# Patient Record
Sex: MALE | Race: WHITE
[De-identification: names, ages, dates, MRNs, and addresses within clinical notes are randomized per-mention and may not be internally consistent; named-entity substitution may affect disease eponyms.]

---

## 2017-03-02 ENCOUNTER — HOSPITAL ENCOUNTER (EMERGENCY)
Dept: HOSPITAL 68 - ERH | Age: 57
Discharge: STILL ADMITTED OR ADMINISTRATIVE | End: 2017-03-02
Payer: COMMERCIAL

## 2017-03-02 VITALS — SYSTOLIC BLOOD PRESSURE: 103 MMHG | DIASTOLIC BLOOD PRESSURE: 71 MMHG

## 2017-03-02 VITALS — HEIGHT: 70 IN | WEIGHT: 180 LBS | BODY MASS INDEX: 25.77 KG/M2

## 2017-03-02 DIAGNOSIS — Z53.21: Primary | ICD-10-CM

## 2017-03-15 ENCOUNTER — HOSPITAL ENCOUNTER (EMERGENCY)
Dept: HOSPITAL 68 - ERH | Age: 57
End: 2017-03-15
Payer: COMMERCIAL

## 2017-03-15 VITALS — HEIGHT: 70 IN | WEIGHT: 180 LBS | BODY MASS INDEX: 25.77 KG/M2

## 2017-03-15 VITALS — SYSTOLIC BLOOD PRESSURE: 126 MMHG | DIASTOLIC BLOOD PRESSURE: 90 MMHG

## 2017-03-15 DIAGNOSIS — F10.10: ICD-10-CM

## 2017-03-15 DIAGNOSIS — F32.9: Primary | ICD-10-CM

## 2017-03-15 LAB
ABSOLUTE GRANULOCYTE CT: 3.9 /CUMM (ref 1.4–6.5)
BASOPHILS # BLD: 0 /CUMM (ref 0–0.2)
BASOPHILS NFR BLD: 0.7 % (ref 0–2)
EOSINOPHIL # BLD: 0.1 /CUMM (ref 0–0.7)
EOSINOPHIL NFR BLD: 1.9 % (ref 0–5)
ERYTHROCYTE [DISTWIDTH] IN BLOOD BY AUTOMATED COUNT: 13.6 % (ref 11.5–14.5)
GRANULOCYTES NFR BLD: 57.7 % (ref 42.2–75.2)
HCT VFR BLD CALC: 52.1 % (ref 42–52)
LYMPHOCYTES # BLD: 2.1 /CUMM (ref 1.2–3.4)
MCH RBC QN AUTO: 34.9 PG (ref 27–31)
MCHC RBC AUTO-ENTMCNC: 34.1 G/DL (ref 33–37)
MCV RBC AUTO: 102.3 FL (ref 80–94)
MONOCYTES # BLD: 0.5 /CUMM (ref 0.1–0.6)
PLATELET # BLD: 158 /CUMM (ref 130–400)
PMV BLD AUTO: 8.6 FL (ref 7.4–10.4)
RED BLOOD CELL CT: 5.09 /CUMM (ref 4.7–6.1)
WBC # BLD AUTO: 6.8 /CUMM (ref 4.8–10.8)

## 2017-03-15 PROCEDURE — G0480 DRUG TEST DEF 1-7 CLASSES: HCPCS

## 2017-03-15 NOTE — ED PSYCHIATRIC COMPLAINT
History of Present Illness
 
General
Chief Complaint: Psychiatric Related Complaint
Stated Complaint: SI/?HI
Source: patient
Exam Limitations: no limitations
Allergies
Coded Allergies:
NO KNOWN ALLERGIES (03/02/17)
 
Reconcile Medications
Amoxicillin 500 MG CAPSULE   2 CAP PO BID ANTIBIOTIC, INFECTION  (Reported)
Aripiprazole (Abilify) 5 MG TABLET   1 TAB PO DAILY MENTAL HEALTH  (Reported)
Clarithromycin 500 MG TABLET   1 TAB PO BID ANTIBIOTIC, INFECTION  (Reported)
Doxepin HCl 50 MG CAPSULE   1-2 CAP PO QPM PRN SLEEP  (Reported)
Escitalopram Oxalate 10 MG TABLET   1 TAB PO DAILY MENTAL HEALTH  (Reported)
Esomeprazole (Nexium) 40 MG CAPSULE.DR   1 CAP PO DAILY GI  (Reported)
Hydroxyzine Pamoate 50 MG CAPSULE   1 CAP PO BID ANXIETY  (Reported)
Tamsulosin HCl 0.4 MG CAP.ER.24H   1 CAP PO DAILY PROSTATE  (Reported)
 
Triage Note:
PT BIBA FROM HOME FOR COMPLANTS OF DEPRESSION. PT
 STATED HE HAS BEEN OF DEPRESSION MEDS
 (LEXAPRO/ABILIFY) FOR APPROX 2-3 WEEKS. PT TEARFUL
 AND "REQUESTING HELP" FOR HIS "DEPRESSION AND
 ANGER". PT DENIED SI/HI. PT ADMITTED TO DRINKING
 FOUR BEERS AND TWO SHOTS OF ALCOHOL. DENIED
 ILLICIT DRUG USE. PT ALSO REQUESTED EVALUATION OF
 SCIATICA. SECURITY AT BEDSIDE FOR WANDING. PT
 CHANGED INTO PAPER SCRUBS.
Triage Nurses Notes Reviewed? yes
HPI:
This patient is a 56-year-old male with a past medical history including alcohol
dependence, pancreatitis, hepatitis C who presented to the emergency department 
today for evaluation of depression.  The patient reported that he has a lot of 
medical problems and has been feeling very depressed and overwhelmed.  He denied
any suicidal ideation.  He did report that he does sometimes think about taking 
his depression out on his wife.  He reported, "not physically."  The patient 
reported that he drinks approximately every other day.  Today he had 4 beers and
2 shots.  The patient denied any illicit drug use.  He denied any chest pain, 
difficulty breathing, or fevers.  He reported that he has withdrawn from alcohol
before, but denied any withdrawal seizures.
(CATALINA JACOBSON,LILY)
 
Vital Signs & Intake/Output
Vital Signs & Intake/Output
 Vital Signs
 
 
Date Time Temp Pulse Resp B/P Pulse O2 O2 Flow FiO2
 
     Ox Delivery Rate 
 
03/15 2132 98.2 98 18 126/90 96 Room Air  
 
03/15 1800 98.1 98 18 124/90 95 Room Air  
 
 
 ED Intake and Output
 
 
 03/16 0000 03/15 1200
 
Intake Total  
 
Output Total  
 
Balance  
 
   
 
Patient 180 lb 
 
Weight  
 
 
 
Past History
 
Travel History
Traveled to [place] past 21 day No
 
Medical History
Any Pertinent Medical History? see below for history
Neurological: NONE
EENT: NONE
Cardiovascular: NONE
Respiratory: NONE
Gastrointestinal: pancreatitis
Hepatic: hepatitis C, ELEVATED LIVER ENZYMES
Renal: NONE
Musculoskeletal: NONE
Psychiatric: anxiety, depression, insomnia, STRESS
Endocrine: NONE
Blood Disorders: NONE
Cancer(s): NONE
GYN/Reproductive: NONE
History of MRSA: No
History of VRE: No
History of CDIFF: No
Isolation History: Standard
 
Surgical History
Surgical History: non-contributory
 
Psychosocial History
Who do you live with Friend
Services at Home None
What is your primary language English
Tobacco Use: Current Daily Use
Daily Tobacco Use Amount/Type: => 5 Cigarettes daily
 
Family History
Family History, If Any:
MOTHER
  FH: diabetes mellitus
  FHx: stroke
FATHER
  FHx: stroke
BROTHER
  FH: heart attack
 
Hx Contributory? No
(LILY ARNOLD PA-C)
 
Review of Systems
 
Review of Systems
Constitutional:
Reports: no symptoms. 
EENTM:
Reports: no symptoms. 
Respiratory:
Reports: no symptoms. 
Cardiovascular:
Reports: no symptoms. 
GI:
Reports: see HPI. 
Genitourinary:
Reports: no symptoms. 
Musculoskeletal:
Reports: no symptoms. 
Skin:
Reports: no symptoms. 
Neurological/Psychological:
Reports: see HPI. 
All Other Systems: Reviewed and Negative
(LILY ARNOLD PA-C)
 
Physical Exam
 
Physical Exam
General Appearance: well developed/nourished, no apparent distress, alert, awake
Neurological/Psychiatric: no motor/sensory deficits, awake, alert, calm, CNs II-
XII nml as tested, depressed affect, oriented x 3
Comments:
Well-developed well-nourished person in no acute distress
HEENT: Normal EENT exam, normocephalic, moist mucous membranes
Pupils equally round and reactive to light.
Neck: Supple, no lymphadenopathy
Back: Normal gait
Cardiovascular: Regular rate and rhythm with no murmurs, rubs, or gallops
Respiratory: No respiratory distress.  Speaking in full sentences
Extremity: Normal and equal pulses.
Neuro: Alert oriented x3, cranial nerves II through XII grossly intact.
Skin: No appreciable rash on exposed skin, skin is warm and dry.
Psych: Mood and affect is depressed
SAD PERSONS Done? patient not suicidal
(LILY ARNOLD PA-C)
 
Progress
Differential Diagnosis: dementia, drug intoxication, drug overdose, drug 
withdrawal, electrolyte abnormality, encephalitis, alcohol intoxication, alcohol
withdrawal, major depressive disorder, generalized anxiety disorder
Plan of Care:
 Orders
 
 
Procedure Date/time Status
 
CIWA 03/15 1635 Active
 
Continuous Observation Monitor 03/15 1535 Active
 
URINE DRUGS OF ABUSE 03/15 1535 Complete
 
ETHANOL 03/15 1535 Complete
 
COMPREHENSIVE METABOLIC PANEL 03/15 1535 Complete
 
CBC WITHOUT DIFFERENTIAL 03/15 1535 Complete
 
 
 Laboratory Tests
 
 
 
03/15/17 1611:
Anion Gap 9, Estimated GFR > 60, BUN/Creatinine Ratio 7.8, Glucose 98, Calcium 
9.9, Total Bilirubin 0.6, AST 95  H, ALT 97  H, Alkaline Phosphatase 61, Total 
Protein 8.2, Albumin 4.3, Globulin 3.9, Albumin/Globulin Ratio 1.1, CBC w Diff 
NO MAN DIFF REQ, RBC 5.09, .3  H, MCH 34.9  H, RDW 13.6, MPV 8.6, Gran % 
57.7, Lymphocytes % 31.8, Monocytes % 7.9, Eosinophils % 1.9, Basophils % 0.7, 
Absolute Granulocytes 3.9, Absolute Lymphocytes 2.1, Absolute Monocytes 0.5, 
Absolute Eosinophils 0.1, Absolute Basophils 0, PUBS MCHC 34.1, Serum Alcohol 
185.0
 
03/15/17 1546:
Urine Opiates Screen < 100.00, Methadone Screen 41, Barbiturate Screen < 60, Ur 
Phencyclidine Scrn < 6.00, Amphetamines Screen 107, U Benzodiazepines Scrn < 85,
Urine Cocaine Screen < 50, Urine Cannabis Screen 38.90
Comments:
3/15/2017 9:12:11 PM: This patient is requesting to go home because he wants to 
go to this appointment with his specialist at [facility] tomorrow. He is stating that 
is he not suicidal. Patient breathalyzed and is under the legal limit. Patient 
is walking with a steady gait and is alert, oriented, and capable of making 
medical decisions. Crisis team memeber saw and evaluated this patient. Gave him 
resources to follow-up for his depression. 
(LILY ARNOLD PA-C)
 
Departure
 
Departure
Disposition: HOME OR SELF CARE
Condition: Stable
Clinical Impression
Primary Impression: Depressed
Qualifiers:  Depression Type: unspecified Qualified Code: F32.9 - Major 
depressive disorder, single episode, unspecified
Referrals:
ERA LOVELL (PCP/Family)
 
Additional Instructions:
Please follow-up with your previously scheduled appointment at [facility] tomorrow. 
Follow-up with the outpatient resources provided to you as needed. Return for 
any worsening symptoms or concerns. 
Departure Forms:
Customer Survey
General Discharge Information
(LILY ARNOLD PA-C)
 
PA/NP Co-Sign Statement
Statement:
ED Attending supervision documentation-
 
[] I saw and evaluated the patient. I have also reviewed all the pertinent lab 
results and diagnostic results. I agree with the findings and the plan of care 
as documented in the PA's/NP's documentation. 
 
[X] I have reviewed the ED Record and agree with the PA's/NP's documentation.
 
[] Additions or exceptions (if any) to the PAs/NP's note and plan are 
summarized below:
[]
 
(NATIVIDAD CHAPMAN,DANYA)
 
(NATIVIDAD CHAPMAN,DANYA)

## 2018-02-02 ENCOUNTER — HOSPITAL ENCOUNTER (INPATIENT)
Dept: HOSPITAL 68 - ERH | Age: 58
LOS: 13 days | DRG: 751 | End: 2018-02-15
Attending: PSYCHIATRY & NEUROLOGY | Admitting: PSYCHIATRY & NEUROLOGY
Payer: COMMERCIAL

## 2018-02-02 VITALS — WEIGHT: 165 LBS | HEIGHT: 70 IN | BODY MASS INDEX: 23.62 KG/M2

## 2018-02-02 DIAGNOSIS — Z72.89: ICD-10-CM

## 2018-02-02 DIAGNOSIS — Z87.19: ICD-10-CM

## 2018-02-02 DIAGNOSIS — E03.9: ICD-10-CM

## 2018-02-02 DIAGNOSIS — B19.20: ICD-10-CM

## 2018-02-02 DIAGNOSIS — F33.2: Primary | ICD-10-CM

## 2018-02-02 LAB
ABSOLUTE GRANULOCYTE CT: 8.2 /CUMM (ref 1.4–6.5)
BASOPHILS # BLD: 0.1 /CUMM (ref 0–0.2)
BASOPHILS NFR BLD: 1.2 % (ref 0–2)
EOSINOPHIL # BLD: 0 /CUMM (ref 0–0.7)
EOSINOPHIL NFR BLD: 0.1 % (ref 0–5)
ERYTHROCYTE [DISTWIDTH] IN BLOOD BY AUTOMATED COUNT: 13.2 % (ref 11.5–14.5)
GRANULOCYTES NFR BLD: 69.2 % (ref 42.2–75.2)
HCT VFR BLD CALC: 49.5 % (ref 42–52)
LYMPHOCYTES # BLD: 2.6 /CUMM (ref 1.2–3.4)
MCH RBC QN AUTO: 33.4 PG (ref 27–31)
MCHC RBC AUTO-ENTMCNC: 34 G/DL (ref 33–37)
MCV RBC AUTO: 98.3 FL (ref 80–94)
MONOCYTES # BLD: 0.9 /CUMM (ref 0.1–0.6)
PLATELET # BLD: 182 /CUMM (ref 130–400)
PMV BLD AUTO: 8.7 FL (ref 7.4–10.4)
RED BLOOD CELL CT: 5.04 /CUMM (ref 4.7–6.1)
WBC # BLD AUTO: 11.9 /CUMM (ref 4.8–10.8)

## 2018-02-02 PROCEDURE — G0463 HOSPITAL OUTPT CLINIC VISIT: HCPCS

## 2018-02-02 PROCEDURE — G0480 DRUG TEST DEF 1-7 CLASSES: HCPCS

## 2018-02-03 NOTE — ED PSYCH CRISIS CONSULTATION
**See Addendum**
Crisis Consult
 
Basic Assessment
Date of Consult: 18
Responsible Person/Accompanied By: self/biba/peer
Insurance Authorization:
Insurance #1:
 
Insurance name: JUAN A GAONA
Phone number: 
Policy number: [id number]
Group number: 
Authorization number: 
 
 
ED Provider:
Patient's ED Provider: Francesca CHAPMAN,Faisal LUCERO
 
Primary Care Physician:
Patient's PCP: Art Barnes
PCP's Phone Number: [phone]
 
Current Psychiatrist: none
Chief Complaint: Psychiatric Related Complaint
Patient's Quote: I'm going through a hard time; I can't take it
Present Illness:
Pt is a 58 yo male biba to [facility] ED last evening on a [employer] PEER for making
statement he wanted to jump in front of a train to kill himself. Pt reports 
having 6 beers and some nips earlier in the day and his ED BAL was .245. Pt also
reported to PD he wanted to trade his food stamps to buy heroin to overdose. Pt 
has a hx of herion abuse but current Urine Drug Screen was negative. Pt reports 
he had been incarcerated for 4.5 months for charges of domestic violence and was
released on . Pt reports  "I lost my old lady"; there is a protective order 
for no contact for 2 yrs and he is homeless. Pt reports his release stipulations
are "undoable" and he is fearful he will quickly violate his orders and be sent 
back to [facility]. Pt states he has had past SI "but its serious this time. its so 
much I can't take it". Pt reports he was inpatient on [facility] in 2017 but 
quickly after discharge began drinking again and stopped taking medications. He 
reports not following plan for outpatient tx at [facility]. Pt reports long hx of 
etoh abuse and after release Th began drinking again. Pt reports no current 
concerns regarding withdrawal. Past reports hx of heroin abuse but no recent use
despite ideation of killing himself by heroin o/d. Pt denies HI. Pt presents as 
depressed, tearful, recent scrapes on nose, with blood shot eyes. Pt is OX3. Pt 
reports being hopeless and helpless and defeated. Case reviewed with Dr Walker
with recommendation for inpatient psychiatric treatment. Due to no current [facility] 
openings pt will be a bed search. Pt reports agreement with plan.
Patient's Address:
[address] [place] [address]
Home Phone Number: [phone]
Other Phone Number: 
 
Who Do You Live With? Other (see notes) (current homelessness)
Family/Informants Interviewed: collateral provided by pt brother Suraj [phone]
[phone]. He reports pt is severely depressed since release from [facility] and having no
where to go. He reports pt has told him he is suicidal and Suraj thinks pt 
will act upon it if he doesn't get help.
Allergies -
Coded Allergies:
NO KNOWN ALLERGIES (17)
 
Current Medications -
No Known Home Medications
 
Laboratory Results:
 Laboratory Tests
 
18 2240:
Urine Opiates Screen < 100.00, Methadone Screen < 40, Barbiturate Screen < 60, 
Ur Phencyclidine Scrn < 6.00, Amphetamines Screen 203, U Benzodiazepines Scrn < 
85, Urine Cocaine Screen < 50, Urine Cannabis Screen < 5.00
 
18 2210:
Anion Gap 21  H, Estimated GFR > 60, BUN/Creatinine Ratio 12.5, Glucose 111  H, 
Calcium 9.9, Total Bilirubin 1.0, AST 97  H, ALT 81  H, Alkaline Phosphatase 73,
Total Protein 8.5  H, Albumin 4.9, Globulin 3.6, Albumin/Globulin Ratio 1.4, CBC
w Diff NO MAN DIFF REQ, RBC 5.04, MCV 98.3  H, MCH 33.4  H, MCHC 34.0, RDW 13.2,
MPV 8.7, Gran % 69.2, Lymphocytes % 21.6, Monocytes % 7.9, Eosinophils % 0.1, 
Basophils % 1.2, Absolute Granulocytes 8.2  H, Absolute Lymphocytes 2.6, 
Absolute Monocytes 0.9  H, Absolute Eosinophils 0, Absolute Basophils 0.1, Serum
Alcohol 245.0
 
(aSy Davis LCSW)
 
Addendum
Addendum
Met with patient for re-evaluation @ 19:00.  Patient presented as alert, sad, 
depressed, oriented x3 with congruent mood and affect.  Patient was tearful with
blood shot eyes.  Patient endorses ongoing SI stating "it comes and goes".  
Patient endorses hopeless/helpless. Patient reports depression of 10 "I am very 
depressed" and anxiety of 10 on a scale of 0 to 10, 10 being most severe.  
Patient denies HI, auditory and visual hallucinations.  Patient reports normal 
appetite and "on and off" sleep.  Patient advised a bed search is being 
conducted and he remains amenable to voluntary inpatient admission. 
(Camelia PRESSLEY,[place])
Addendum
Addendum 18:
 
Crisis Clinician saw pt. at approx. 9:45 am.  He reported that he was "sitting 
here thinking about how I could successfully do this (commit suicide)".  When 
asked how, he stated "any way that's not painful".  He agreed to staying in the 
ED with a bed search for a psych hospitalization because if he left "he was 
going to do something".  He stated he did not feel he would do anything to 
attempt suicide in the ED.  
(Augustine LESTER,Bryanna)
 
Past History
 
Past Medical History
Neurological: NONE
EENT: NONE
Cardiovascular: NONE
Respiratory: NONE
Gastrointestinal: pancreatitis
Hepatic: hepatitis C, ELEVATED LIVER ENZYMES
Renal: NONE
Musculoskeletal: NONE
Psychiatric: anxiety, depression, insomnia, STRESS
Endocrine: NONE
Blood Disorders: NONE
Cancer(s): NONE
GYN/Reproductive: NONE
 
Past Surgical History
Surgical History: non-contributory
 
Psychosocial History
Strengths/Capabilities:
pt hopes to stay sober, takes medications and getting back to working as a 
apainter
Physical Limitations (Interventions):
None known
 
Psychiatric Treatment History
Psych Treatment
   Psychiatric Treatment Yes
   Inpatient Treatment Yes
   Outpatient Treatment Yes
   Location of Treatment [facility] 2017; [facility] attended intake but didn't 
follow up
   Reason for Treatment
depression; etoh
   Response to Treatment
pt recently soon after [facility] discharge and stopped taking his medications
Diagnosis by History:
Depression
Alcohol use d/o
Opioid use d/o
 
Substance Use/Abuse History
Drug Use/Abuse
   Substances Used/Abused Yes
   Substance Used/Abused Alcohol
   Last Used yesterday
   How much used/taken 6 beers and a few nips
   How often daily
 
Substance Abuse Treatment
Substance Abuse Treatment
   Past Substance Abuse [place] Yes
   Inpatient Treatment Yes
   Outpatient Treatment Yes
   Location of Treatment [facility]
   Reason for Treatment
etoh and opiate use d/o
   Dates of Treatment 
   Response to Treatment
pt has difficulty maintaining sobriety. Pt reports immediate release following 
[facility] discharge in 2017 and became intoxicate following release from [facility] 
this past Th following 4 month incarceration.
Comments:
pt has a long hx of etoh abuse. Pt reports hx of heroin abuse. Pt reports if he 
had the money he would buy enough heroin to o/d
(Susan Say LESTER)
 
Current Mental Status
 
Mental Status
Orientation: Person, Place, Situation
Affect: Depressed
Speech: WNL
Neuro-vegetative: Appetite Decreased, Concentration Poor, Energy Decreased, 
Helpless, Loss of Interest, Sleep Disturbance
 
Behaviors
Thought Process: WNL
Thought Content: WNL
Memory: WNL
Insight: Fair
 
SI/HI Risk Assessment
Past Suicidal Ideation/Attempts Yes
Current Suicidal Ideation/Att Yes
Past Homicidal Ideation/Att: No
Current Homicidal Ideation/Attempts No
Degree of Intent: States Intent
Danger To: Self
Gravely Disabled: Poor Impulse Control, Poor Judgment
Risk Factors: chronic/serious med cond., high anxiety/distress, history of 
suicide atmpts, SA/MH hospitalized, substance abuse, poor impulse control, lives
alone, male, limited support
Lethality Ratin
 
PTSD Checklist
PTSD Done? patient declined
 
ED Management
Sitter: Yes
Restraints: No
(Say Davis LCSW)
 
DSM5/PS Stressors/Medical Prob
Diagnosis' (DSM 5, Stressors, Medical):
Unspecified Depression (F32.9)
Alcohol Use severe (F10.20)
 
Current GAF: 20
Comments:
pt reports release th from 4 mos incarceration for dv towards wife. He reports 
current homelessness, protective orders and several stipulations that he thinks 
are undoable.. Pt expressed fear that he will violate probation and be sent back
to [facility]. Pt reports he will kill himself instead of returning
(Say Davis LCSW)
 
Departure
 
Disposition
Psych Medical Clearance
   Date: 18
   Medically Cleared at: 0915
   Time Started: 0915
   Time Ended: 1000
   Psychiatrist Consulted: Arsenio Cisse MD (Mulu Walker MD)
Date Disposition Established: 18
Time Disposition Established: 1100
Plan for Disposition -
   Modality: Inpatient Psychiatry
Rationale for Disposition:
Pt required inpatient psychiatric admission for mood stabilization and to 
reassess for restarting psychotropic medications.
Referrals
Art Barnes (PCP/Family)
 
(Say Davis LCSW)

## 2018-02-04 NOTE — ED PSYCHIATRIST/APRN CONSULT
Psychiatrist/APRN ED Consult
Assessment and Plan:
Pt seen as f/u. 
 
Pt notes he is continuing to struggle with thoughts of harm to self. Only feels 
safe because here in hospital. 
 
MSE
General appearance: fair hygiene and grooming; 
Attitude: cooperative;
Eye contact: appropriate; 
Movement: no psychomotor agitation or slowing; 
Speech: nl fluency, nl rate/rhythm, nl volume, nl prosody; 
Mood: "not good at all"
Affect: irritable, flat, appropriate, constricted, non-labile, congruent; 
Thought process: linear and goal-directed; 
Thought content: active SI, no paranoid ideation; 
Perception: denied hallucinations- auditory, visual, does not appear to be 
responding to internal stimuli; 
I/J: limited
 
A/P: Pt with worsening mood sx and SI in the setting of multiple psychosocial 
stressors. 
 
- Bed search underway

## 2018-02-05 VITALS — SYSTOLIC BLOOD PRESSURE: 127 MMHG | DIASTOLIC BLOOD PRESSURE: 78 MMHG

## 2018-02-05 NOTE — ED PSYCHIATRIST/APRN CONSULT
Psychiatrist/APRN ED Consult
Assessment and Plan:
Psychiatrist's Consult
 
Date of Consult: 2/5/2018
 
Reason For Consult: Suicide Statements
 
Background:
57-year-old male biba to [place] ED on a PEER for making statement he wanted to 
jump in front of a train to kill himself. Pt reports having 6 beers and some 
nips earlier in the day and his ED BAL was .245. Pt also reported to PD he 
wanted to trade his food stamps to buy heroin to overdose. Pt has a hx of herion
abuse but current Urine Drug Screen was negative. Pt reports he had been 
incarcerated for 4.5 months for charges of domestic violence and was released on
Thursday. Pt reports  "I lost my old lady"; there is a protective order for no 
contact for 2 yrs and he is homeless.
 
Mental State:
Alert and oriented to time, place, and person. Having a meal in bed, agreeable 
to going inpatient psych. Acknowledged making statements about ending his life. 
Denied violent thoughts or thoughts of homicide. Denied hallucinations and did 
not have delusions or a thought disorder.
 
Assessment:
57-year-old white male presenting with depressive symptoms and thoughts of 
suicide as well as alcohol use disorder.
 
Likely Diagnoses:
Unspecified Depressive Disorder
Alcohol Use Disorder
Hepatitis C; 
pancreatitis by history;  
hypothyroidism
 
Recommendation:
Inpatient Psych Admission
CIWA with lorazepam coverage if there are withdrawal symptoms and/or signs

## 2018-02-06 VITALS — SYSTOLIC BLOOD PRESSURE: 120 MMHG | DIASTOLIC BLOOD PRESSURE: 75 MMHG

## 2018-02-06 VITALS — DIASTOLIC BLOOD PRESSURE: 52 MMHG | SYSTOLIC BLOOD PRESSURE: 95 MMHG

## 2018-02-06 VITALS — DIASTOLIC BLOOD PRESSURE: 56 MMHG | SYSTOLIC BLOOD PRESSURE: 110 MMHG

## 2018-02-06 VITALS — SYSTOLIC BLOOD PRESSURE: 140 MMHG | DIASTOLIC BLOOD PRESSURE: 80 MMHG

## 2018-02-06 VITALS — SYSTOLIC BLOOD PRESSURE: 122 MMHG | DIASTOLIC BLOOD PRESSURE: 72 MMHG

## 2018-02-06 VITALS — SYSTOLIC BLOOD PRESSURE: 152 MMHG | DIASTOLIC BLOOD PRESSURE: 90 MMHG

## 2018-02-06 VITALS — DIASTOLIC BLOOD PRESSURE: 80 MMHG | SYSTOLIC BLOOD PRESSURE: 140 MMHG

## 2018-02-06 VITALS — DIASTOLIC BLOOD PRESSURE: 71 MMHG | SYSTOLIC BLOOD PRESSURE: 112 MMHG

## 2018-02-06 NOTE — CONS- ENDOCRINOLOGY
General Information and HPI
 
Consulting Request
Date of Consult: 02/06/18
Requested By: [facility]
Reason for Consult:
evaluation of abnormal TFT.
Source of Information: patient, old records
Exam Limitations: no limitations
History of Present Illness:
58 y/o M with PMH significant for Hepatitis C,ETOH abuse and drug abuse,  
anxiety, depression, was admitted to [facility] with SI. Patient claims that he 
had done overdose of heroin before and this time he wanted to jump in front of a
train to kill himself. Blood work showed TSH 2.83, free T4 1.06 and TT4 14.6. I 
was asked to see him for evaluation of abnormal TFT.
 
He denied having any hx of thyroid disorder. His grandmother had thyroid 
condition.
 
Allergies/Medications
Allergies:
Coded Allergies:
NO KNOWN ALLERGIES (NONE 02/06/18)
 
Home Med List:
No Known Home Medications
 
 
Review of Systems
 
Review of Systems
Constitutional:
Reports: see HPI. 
Cardiovascular:
Denies: chest pain. 
Respiratory:
Denies: short of breath. 
GI:
Denies: abdominal pain. 
Neurological/Psychological:
Reports: depressed. 
Hematologic/Endocrine:
Denies: polyuria, polydipsia. 
 
Past History
 
Travel History
Traveled to [place] past 21 day No
 
Medical History
Neurological: NONE
EENT: NONE
Cardiovascular: NONE
Respiratory: NONE
Gastrointestinal: pancreatitis
Hepatic: hepatitis C, ELEVATED LIVER ENZYMES
Renal: NONE
Musculoskeletal: NONE
Psychiatric: anxiety, depression, insomnia, STRESS
Endocrine: NONE
Blood Disorders: NONE
Cancer(s): NONE
GYN/Reproductive: NONE
 
Surgical History
Surgical History: non-contributory
 
Family History
Relations & Conditions If Any:
MOTHER
  FH: diabetes mellitus
  FHx: stroke
FATHER
  FHx: stroke
BROTHER
  FH: heart attack
 
 
Psychosocial History
Where Do You Live? Home
Services at Home: None
ETOH Use: occasional use
Illicit Drug Use: denies illicit drug use
 
Exam & Diagnostic Data
Last 24 Hrs of Vital Signs/I&O
 Vital Signs
 
 
Date Time Temp Pulse Resp B/P B/P Pulse O2 O2 Flow FiO2
 
     Mean Ox Delivery Rate 
 
02/06 2008 97.4 72  120/75     
 
02/06 2007 97.4 72  120/75     
 
02/06 1615  77  140/80     
 
02/06 1604  77  140/80     
 
02/06 1305  88  122/72     
 
02/06 1140 98.9 58 16 152/90     
 
02/06 1138 98.9 58 16 152/90  99 Room Air  
 
02/06 1003 97.6 52 18 123/76  98 Room Air  
 
02/06 0910 97.8 64 20 110/56     
 
02/06 0817 97.8 46 20 110/56  95 Room Air  
 
02/06 0559 98.0 85 20 95/52     
 
02/06 0530 98.0 65 20 94/52  96   
 
02/05 2218 98.3 57 16 127/78  97   
 
 
 Intake & Output
 
 
 02/06 1600 02/06 0800 02/06 0000
 
Intake Total   
 
Output Total   
 
Balance   
 
    
 
Patient 165 lb  
 
Weight   
 
 
 
 
Physical Exam
General Appearance: no apparent distress
Neck: no significant thyromegaly
Respiratory: lungs clear
Cardiovascular: tachycardia
Gastrointestinal: soft
Extremities: no edema
Labs/Riley Results:
 Laboratory Tests
 
 
 02/06
 
 1426
 
Chemistry 
 
  TSH Cancelled
 
  Free T4 Cancelled
 
  Thyroxine (T4) Cancelled
 
 
 
 
Assessment/Plan
Assessment/Plan
58 y/o M with PMH significant for Hepatitis C,ETOH abuse and drug abuse,  
anxiety, depression, was admitted to [facility] with SI. Patient claims that he 
had done overdose of heroin before and this time he wanted to jump in front of a
train to kill himself. Blood work showed TSH 2.83, free T4 1.06 and TT4 14.6.
 
He has normal TSH and free T4 which suggests that his thyroid function is in the
normal range; the elevated TT4 could be due to the elevated TBG related to his 
liver disease-- hepatitis C and ETOH abuse. However, in 9/2017, his TSH was 9.45
, TT3 1.94 and free T40.75; anti TPO was < 28 and anti Tg 18.
 
I will recommend repeating TFT and ing thyroid antibody panel just to make sure.
 
will follow.
 
 
Consult Acknowledgment
- Thank you for your consult request.
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
## 2018-02-06 NOTE — HISTORY & PHYSICAL
General Information and HPI
MD Statement:
I have seen and personally examined FE PAGE and documented this H&P.
 
The patient is a 57 year old M who presented with a patient stated chief 
complaint of SI
 
Source of Information: patient
Exam Limitations: no limitations
History of Present Illness:
58 y/o M with pmh sig for Hep C, anxiety, depression, is admitted to [facility] with 
SI. Patient claims that he had done overdose of heroin before and this time he 
wanted to jump in front of a train to kill himself. Prior to this patient was 
incarcerated for few months. He is feeling depressed, anxious and c/o some 
headache as lots of thoughts are going through his head. His TFTs are also 
abnormal. Patient otherwise denies any complaints. he is also drinking ETOH. 
 
 
Allergies/Medications
Allergies:
Coded Allergies:
NO KNOWN ALLERGIES (NONE 02/06/18)
 
Home Med list
No Known Home Medications
 
 
Past History
 
Travel History
Traveled to [place] past 21 day No
 
Medical History
Neurological: NONE
EENT: NONE
Cardiovascular: NONE
Respiratory: NONE
Gastrointestinal: pancreatitis
Hepatic: hepatitis C, ELEVATED LIVER ENZYMES
Renal: NONE
Musculoskeletal: NONE
Psychiatric: anxiety, depression, insomnia, STRESS
Endocrine: NONE
Blood Disorders: NONE
Cancer(s): NONE
GYN/Reproductive: NONE
History of MRSA: No
History of VRE: No
History of CDIFF: No
Isolation History: Standard
Tetanus Vaccine: 08/05/17
 
Surgical History
Surgical History: non-contributory
 
Past Family/Social History
 
Family History
Relations & Conditions if any
MOTHER
  FH: diabetes mellitus
  FHx: stroke
FATHER
  FHx: stroke
BROTHER
  FH: heart attack
 
 
Psychosocial History
Where do you live? Home
Services at Home: None
ETOH Use: occasional use
Illicit Drug Use: denies illicit drug use
 
Review of Systems
 
Review of Systems
Constitutional:
Reports: see HPI. 
EENTM:
Reports: see HPI. 
Cardiovascular:
Reports: see HPI. 
Respiratory:
Reports: see HPI. 
GI:
Reports: see HPI. 
Musculoskeletal:
Reports: see HPI. 
Skin:
Reports: see HPI. 
Neurological/Psychological:
Reports: see HPI. 
 
Exam & Diagnostic Data
Last 24 Hrs of Vital Signs/I&O
 Vital Signs
 
 
Date Time Temp Pulse Resp B/P B/P Pulse O2 O2 Flow FiO2
 
     Mean Ox Delivery Rate 
 
02/06 1615  77  140/80     
 
02/06 1604  77  140/80     
 
02/06 1305  88  122/72     
 
02/06 1140 98.9 58 16 152/90     
 
02/06 1138 98.9 58 16 152/90  99 Room Air  
 
02/06 1003 97.6 52 18 123/76  98 Room Air  
 
02/06 0910 97.8 64 20 110/56     
 
02/06 0817 97.8 46 20 110/56  95 Room Air  
 
02/06 0559 98.0 85 20 95/52     
 
02/06 0530 98.0 65 20 94/52  96   
 
02/05 2218 98.3 57 16 127/78  97   
 
02/05 2200 98.3 57 16 127/78     
 
02/05 1958  55 20 132/80  95 Room Air  
 
 
 Intake & Output
 
 
 02/06 1600 02/06 0800 02/06 0000
 
Intake Total   
 
Output Total   
 
Balance   
 
    
 
Patient 165 lb  
 
Weight   
 
 
 
 
Physical Exam
General Appearance Alert, Oriented X3, Cooperative, No Acute Distress
Skin Scrape oh on the nose. 
HEENT PERRLA
Neck Supple
Cardiovascular Regular Rate, Normal S1, Normal S2
Lungs Clear to Auscultation
Abdomen Normal Bowel Sounds, Soft, No Tenderness
Neurological
   Cranial Nerves II through XII:
Intact
Last 24 Hrs of Labs/Riley:
 Laboratory Tests
 
02/06/18 1426:
TSH Cancelled, Free T4 Cancelled, Thyroxine (T4) Cancelled
 
 Laboratory Tests
 
 
 02/06
 
 1426
 
Chemistry 
 
  TSH Cancelled
 
  Free T4 Cancelled
 
  Thyroxine (T4) Cancelled
 
 
 
 
Assessment/Plan
Assessment:
57-year-old male with past history significant for hepatitis C, depression, 
anxiety, alcohol use admitted to Inpatient Psychiatry suicidal ideation.  
Patient has attempted to jump in front of the train as well as doing heroine 
overdose.
He has abnormal thyroid function.  Recommend endocrinology consult.  Patient 
currently on Ativan and thiamine.  We leave the rest of the psych management up 
to psychiatry.
I will order endocrinology consult. 
 
As Ranked By This Provider
Problem List:
 1. Chronic hepatitis C
 
 2. Depression
 
 3. Alcohol abuse
 
 4. Alcoholism
 
 5. Abnormal thyroid blood test
 
 
Miscellaneous
 
Miscellaneous Documentation
Attending Case Discussed With:
Priya Jamison MD
 
Primary Care Physician:
Art Barnes
 
Patient sees these Specialists
none
Level of Patient Care: [facility]

## 2018-02-07 VITALS — DIASTOLIC BLOOD PRESSURE: 76 MMHG | SYSTOLIC BLOOD PRESSURE: 131 MMHG

## 2018-02-07 VITALS — DIASTOLIC BLOOD PRESSURE: 83 MMHG | SYSTOLIC BLOOD PRESSURE: 148 MMHG

## 2018-02-07 VITALS — DIASTOLIC BLOOD PRESSURE: 77 MMHG | SYSTOLIC BLOOD PRESSURE: 114 MMHG

## 2018-02-07 VITALS — SYSTOLIC BLOOD PRESSURE: 114 MMHG | DIASTOLIC BLOOD PRESSURE: 77 MMHG

## 2018-02-07 VITALS — SYSTOLIC BLOOD PRESSURE: 132 MMHG | DIASTOLIC BLOOD PRESSURE: 75 MMHG
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
## 2018-02-08 VITALS — DIASTOLIC BLOOD PRESSURE: 82 MMHG | SYSTOLIC BLOOD PRESSURE: 140 MMHG

## 2018-02-08 VITALS — SYSTOLIC BLOOD PRESSURE: 122 MMHG | DIASTOLIC BLOOD PRESSURE: 83 MMHG

## 2018-02-08 VITALS — DIASTOLIC BLOOD PRESSURE: 80 MMHG | SYSTOLIC BLOOD PRESSURE: 135 MMHG

## 2018-02-08 VITALS — SYSTOLIC BLOOD PRESSURE: 137 MMHG | DIASTOLIC BLOOD PRESSURE: 97 MMHG

## 2018-02-08 VITALS — SYSTOLIC BLOOD PRESSURE: 135 MMHG | DIASTOLIC BLOOD PRESSURE: 80 MMHG

## 2018-02-08 VITALS — DIASTOLIC BLOOD PRESSURE: 97 MMHG | SYSTOLIC BLOOD PRESSURE: 137 MMHG

## 2018-02-08 NOTE — [facility] PROGRESS NOTE PSYCH
Psych (Inpt) Progress Note
Progress Note
Include the following elements, when applicable:
Involvement in the active treatment of the patient with behavioral observations 
of the patient and the patient's response to the treatment.
Review of the ongoing treatment process in the context of the treatment plan.
Indication of how multi-disciplinary staff members are carrying out the 
treatment plan.
Plans for future interventions and recommendations for revision of the treatment
plan.
Liaison with other physicians/providers.
Progress Note:
Case and treatment plan discussed in team meeting.  Staff reports that the 
patient is feeling very depressed.  Denying plans to harm himself.  Described as
anxious.  Patient will have a screening for [facility].  Dr. Ahmadi started 
the patient on levothyroxine 50 mcg daily.
 
Patient seen at 10:16 AM.  He was in group prior to meeting with me in office.  
Reports he is tired and he did not sleep last night despite trazodone.  Reports 
he just couldn't sleep and he reports he does not know what is wrong.  He 
indicates he had racing thoughts about wanting to leave and not wanting help.  
Tolerating Abilify.  Reports mood is "okay, going through some swings, nothing 
bad, it's like fog."  Rates sad mood and anxiety both 5/10.  Denies feeling 
hopeless, helpless or worthless.  Does feel guilty.  Denies active and passive 
suicidal ideation.  Denies homicidal ideation.  Reports he heard voices last 
night around 7 PM, "you could leave, hurt yourself."  Denies visual 
hallucinations and paranoid ideation.  Reports appetite is good.  Energy is 
none.  Patient would like Abilify dose increased to 10 mg daily.  I made this 
change, including a makeup dose of 5 mg today at 2 PM.
 
IMPRESSION:
Slow progress.  Continue present treatment plan.  Continues to require inpatient
level of care.  We are looking into rehab placements.

## 2018-02-08 NOTE — SOCIAL WORKER PROG NOTE PSYCH
Social Work Progress Note
Progress Note
Called [employer] and left a message about setting up a screening.  Sawyer has a 
screening at 2:45pm today with [facility] in [place].  
Met with Sawyer this morning.  He reported he had a horrible night due to not 
sleeping well.  He said he woke up several times.  He said he was having 
negative thoughts.  Shared that he was having thoughts about hurting his ex-
fiance.  He said he would never do that.  Doesn't understand what happened and 
doesn't really remember.  Reports feeling in a fog today, with mood being up and
down at times.  He said he is trying to focus on the positive.  Signed a release
for his Brother Sae [phone].  He is open to him coming in, but doesn't 
think he will.  He also signed a release for the [facility].  Reminded him of his 
screening at 2:45 today with [facility].  
Janice Lopez [facility] Sawyer to 
screen him for their program.  We spoke after their meeting.  They have concerns
right now related to his mental health.  They don't feel he is quite ready for 
discharge due to still having SI.  They will keep him on the list and check in 
next week.
Sawyer talked with me briefly before calling Cindy at [facility].  He stated he didn'
t think he really needed a rehab and just needed help with his mental health and
housing.  I told him I thought he should complete the screening because he does 
have a hx of use and he was drinking.  He needs the help and that would be a 
place to buy him more time to work on his symptoms and housing issue.  He called
and completed the screening.
Called Sawyer's Brother Matt [phone].  He really didn't appear to be engaged 
in our conversation.  Stated that he did not have a car and would not be able to
get in to see Sawyer.  Stated he is trying to figure out how to get his clothes 
here from his friend Titi's house.  I told him what we were working on as a 
discharge plan.  I asked if there was family or friends for housing options?  He
said no.  He will keep in contact with Sawyer.

## 2018-02-08 NOTE — PN- ENDOCRINOLOGY
Assessment/Plan
Assessment:
58 y/o M with PMH significant for Hepatitis C,ETOH abuse and drug abuse,  
anxiety, depression, was admitted to [facility] with SI. Patient claims that he 
had done overdose of heroin before and this time he wanted to jump in front of a
train to kill himself. Blood work showed TSH 2.83, free T4 1.06 and TT4 14.6.
 
He has normal TSH and free T4 which suggests that his thyroid function is in the
normal range; the elevated TT4 could be due to the elevated TBG related to his 
liver disease-- hepatitis C and ETOH abuse. However, in 9/2017, his TSH was 9.45
, TT3 1.94 and free T40.75; anti TPO was < 28 and anti Tg 18.
 
On 2/7/2018, Repeat TSH 8.910, free T4 0.86 and TT3 2.25; anti TPO 17 and anti 
Tg 33.
 
 
Plan:
1. start Levothyroxine 50 mcg daily;
2. repeat TFT in 6-8 weeks;
3. f/u in office after discharge and will order thyroid u.s as outpatient.
 
Subjective
Subjective:
He still feels confused intermittently.
 
Objective
Last 24 Hrs of Vital Signs/I&O
 Vital Signs
 
 
Date Time Temp Pulse Resp B/P B/P Pulse O2 O2 Flow FiO2
 
     Mean Ox Delivery Rate 
 
02/08 0753 98.0 83  135/80     
 
02/08 0749 98.0 83  135/80     
 
02/07 1949 98.1 97  131/76     
 
02/07 1946 98.1 97  131/76     
 
02/07 1602  78  132/75     
 
02/07 1556  78  132/75     
 
02/07 1229  74  148/83     
 
02/07 1203  74  148/83     
 
 
 
 
Results
Pertinent Lab/Riley Results:
 Laboratory Tests
 
 
 02/07 02/06
 
 0640 1426
 
Chemistry  
 
  TSH (0.270 - 4.200 uIU/mL) 8.910  H Cancelled
 
  Free T4 (0.64 - 1.79 ng/dL) 0.86 Cancelled
 
  Thyroxine (T4)  Cancelled
 
  Total T3 (0.97 - 1.69 ng/mL) 2.25  H 
 
Immunology  
 
  Thyroglobulin Antibody (< 61 U/mL) 17 
 
  Thyroid Peroxidase Ab (< 61 U/mL) 33

## 2018-02-09 VITALS — DIASTOLIC BLOOD PRESSURE: 85 MMHG | SYSTOLIC BLOOD PRESSURE: 120 MMHG

## 2018-02-09 VITALS — SYSTOLIC BLOOD PRESSURE: 133 MMHG | DIASTOLIC BLOOD PRESSURE: 90 MMHG

## 2018-02-09 VITALS — DIASTOLIC BLOOD PRESSURE: 90 MMHG | SYSTOLIC BLOOD PRESSURE: 133 MMHG

## 2018-02-09 VITALS — DIASTOLIC BLOOD PRESSURE: 70 MMHG | SYSTOLIC BLOOD PRESSURE: 143 MMHG

## 2018-02-09 VITALS — SYSTOLIC BLOOD PRESSURE: 141 MMHG | DIASTOLIC BLOOD PRESSURE: 96 MMHG

## 2018-02-09 VITALS — DIASTOLIC BLOOD PRESSURE: 96 MMHG | SYSTOLIC BLOOD PRESSURE: 141 MMHG

## 2018-02-09 VITALS — SYSTOLIC BLOOD PRESSURE: 143 MMHG | DIASTOLIC BLOOD PRESSURE: 90 MMHG

## 2018-02-09 NOTE — SOCIAL WORKER PROG NOTE PSYCH
Social Work Progress Note
Progress Note
Patient asked for assistance in getting a Release of Information to [facility] in [place]. Assisted the patient in completing the release and 
faxed to [facility] gave patient a copy and retained one for social 
work.

## 2018-02-09 NOTE — [facility] PROGRESS NOTE PSYCH
Psych (Inpt) Progress Note
Progress Note
Vital Signs: Temp. 96.5, Pulse: 66/min; BP: 120/85mmHg
 
Mental Status Examination
Sawyer reported restless sleep, he reported that Trazodone made him very stuffed up
in the nose, breathing through mouth and felt hung over in AM.
Sawyer was calm, polite and cooperative. There is no psychomotor agitation or 
retardation. Normal speech, mood is depressed, despondent. Rates anxiety 7/10.  
somewhat hopeless, denied suicidal ideation, denies homicidal ideation. Denied 
hallucinations, reports his mind races when he is anxious. Denies paranoid 
ideation and magical allison. He was coherent, thinking is clear, logical and 
goal-directed. He was alert and oriented 3, grossly intact memory 
 
Impression:
A 56-year-old single white male who was admitted on 2/6/18 for suicidal 
ideation. He--reportedly--wanted to jump in front of a train to kill himself or 
wanted to buy heroin to overdose. Reports that in January, while still in [facility], 
he started to plan out a suicide attempt, to overdose.
Likely Diagnoses:
Major depression, recurrent, severe.
Alcohol use disorder.
Hepatitis C.
History of pancreatitis.
Hypothyroidism.
 
Treatment Plan update:
D/C Trazodone
Increase bedtime Gabapentin to 900 mg
Continue Abilify 10mg daily 
Monitor on the unit for safety, alcohol withdrawal and mood disorder.
Psychiatrist will evlauate patient daily for mental status exam amd medication 
monitoring/management
 
Anticipate once clinically stable, that the patient will be referred to an 
inpatient rehab.
 
inpatient rehab.
 
Depressed at 10/10.
Rates anxiety 7/10.  Feels hopeless, helpless, worthless and guilty.
Suicidal Ideation:
Reports suicidal ideation.  Gives a safety promise for here.
Homicidal Ideation:
Denies homicidal ideation.
Hallucinations:
Reports command auditory hallucinations, more than 1 voice, inside his head, 
male.  Reports his mind races when he is anxious.  Denies visual hallucinations.
Paranoid/Delusional Material:
Denies paranoid ideation and magical allison.
Difficulties with thought organization:
Thinking is clear, logical and goal-directed.
Insight:
Limited.
Judgment:
Poor.
Orientation:
Oriented 3.
Cognition:
Grossly intact.
Memory Function:
Grossly intact.
Estimate of intellectual functioning:
Average.
 
Assets/Strengths
Patient Identified Assets/Strengths:
Not asked.
 
Impression/Plan
Impression and Plan:
The patient is here with suicidal ideation and marked depression in the context 
of relapse with alcohol, being homeless, having minimal supports, being 
unemployed, and having been recently released from [facility].
- Include all active medical diagnosis that require tx
DSM 5 Diagnosis(es):
Major depression, recurrent, severe.
Alcohol use disorder.
Hepatitis C.
History of pancreatitis.
Hypothyroidism.
- Initial Tx Plan for Active Psych & Medical Conditions
Treatment Plan:
The patient will be monitored on the unit for safety, alcohol withdrawal and 
mood disorder.
 
Patient was treated in the past with Abilify and Lexapro.
 
At this point, we will restart Abilify at 5 mg daily to address auditory 
hallucinations.  Once hallucinations remit, we will consider addition of Lexapro
for depression.
Additional information is needed from brother.
 
Anticipate once clinically stable, that the patient will be referred to an 
inpatient rehab.
 
 
02/03 1710 98.8 62 20 111/72  95   
 
02/03 1459 99.0 82 18 107/66  100 Room Air  
 
02/03 0625 98.7 79 18 98/61  95 Room Air  
 
02/03 0114 97.8 78 20 97/62  99 Room Air  
 
02/02 2145 96.0 91 18 120/81  97 Room Air  
 
 
 Orders
 
 
Procedure Date/time Status
 
SUB HSP (15 MIN) 02/08  UNK Complete
 
Change service to 02/08  UNK Active
 
THYROID STIMULATING HORMONE 02/07 0600 Complete
 
TOTAL TRIODOTHYROXINE 02/07 0600 Complete
 
FREE T4 02/07 0600 Complete
 
THYROID ANTIBODY PANEL 02/07 0600 Complete
 
INIT HSP (30 MIN) 02/07  UNK Complete
 
CIWA 02/07  UNK Active
 
MISSING MEDICATION FORM 02/07  UNK Active
 
EKG 02/07  UNK Active
 
Regular Diet 02/06 D Active
 
Patient Data - inpatient psych 02/06 1426 Active
 
Admit to inpatient psych 02/06 1426 Active
 
Vital Signs 02/06 1249 Active
 
Inpt Psych Teach/Educate 02/06 1249 Active
 
Nutritional Intake, Monitor 02/06 1249 Active
 
Inpt Psych Auricular Acupunctu 02/06 1249 Active
 
Admit to inpatient psych 02/06 1019 Active
 
Activity/Ambulation 02/06  UNK Active
 
PHYSICIAN CONSULT 02/06  UNK Active
 
CIWA 02/05 2200 Complete
 
THYROID STIMULATING HORMONE 02/02 2210 Complete
 
THYROXINE 02/02 2210 Complete
 
FREE T4 02/02 2210 Complete
 
Intake & Output 02/02 2144 Complete

## 2018-02-09 NOTE — SOCIAL WORKER PROG NOTE PSYCH
Social Work Progress Note
Progress Note
Met with Sawyer this morning.  Asked how his screening went with [facility] 
yesterday?  He stated he thought it went well and that she told him that his 
referral will be reviewed with the team.  We talked about his interaction with 
the managers from the [facility] and how they feel he
needs to stablize further here, before coming to their program.  He said that 
they did share that with him.  He is looking forward to the possiblity of going 
to [facility] and then transferring to a longer term program from there.  He said
he had a good day yesterday.  Thoughts are less negative.  Reports a nightmare 
last night about  chasing him and him ending up in a psych rivera.  Attending 
groups.  Asked for the number to Social Security which was given to him.  
 
Spoke with Cindy from [facility] about Sawyer's screening.  She said her only issue 
with him is that [facility] may not authorize due to the length of time he has been 
drinking, having been recently discharged from incarceration.  Made an argument 
that he is at risk without inpatient tx at this time due to his hx.  She will 
speak with [facility] and let us know the outcome.

## 2018-02-10 VITALS — SYSTOLIC BLOOD PRESSURE: 121 MMHG | DIASTOLIC BLOOD PRESSURE: 81 MMHG

## 2018-02-10 VITALS — DIASTOLIC BLOOD PRESSURE: 72 MMHG | SYSTOLIC BLOOD PRESSURE: 117 MMHG

## 2018-02-10 VITALS — SYSTOLIC BLOOD PRESSURE: 136 MMHG | DIASTOLIC BLOOD PRESSURE: 76 MMHG

## 2018-02-10 VITALS — DIASTOLIC BLOOD PRESSURE: 84 MMHG | SYSTOLIC BLOOD PRESSURE: 133 MMHG

## 2018-02-10 VITALS — SYSTOLIC BLOOD PRESSURE: 133 MMHG | DIASTOLIC BLOOD PRESSURE: 84 MMHG

## 2018-02-10 NOTE — CP SOUTH PROGRESS NOTE PSYCH
Psych (Inpt) Progress Note
Progress Note
Include the following elements, when applicable:
Involvement in the active treatment of the patient with behavioral observations 
of the patient and the patient's response to the treatment.
Review of the ongoing treatment process in the context of the treatment plan.
Indication of how multi-disciplinary staff members are carrying out the 
treatment plan.
Plans for future interventions and recommendations for revision of the treatment
plan.
Liaison with other physicians/providers.
Progress Note:
 
 
SUBJECTIVE:  Patient reports that he just had an upsetting phone call with his 
brother, that his belongings are no longer in his roommate's house.  Patient 
states "I will get over it."  States that his mood has been "excellent" all day 
today until that phone call.  Patient states yesterday was a rough day, with low
mood, but is much better today.  Denies any signs or symptoms of withdrawal.  
Tolerating medication increase.  Denies any side effects to medicines or 
abnormal movements.  Continues to have vague SI intermittently, no HI/AVH/SIB.  
States he is eating very well.  No pain.  Reviewed medications and answered 
patient's questions.
 
OBJECTIVE:  Per nursing, pt did well overnight without any acute events.  Pt 
remained in behavioral control, adherent with staff instructions and 
medications.  CIWAs have been 0 for last 2 days, discontinued.
 
 Current Medications
 
 
  Sig/Leyda Start time  Last
 
Medication Dose Route Stop Time Status Admin
 
Acetaminophen 650 MG Q6P PRN 02/07 1500 AC 02/08
 
  PO   1531
 
Al Hydroxide/Mg  30 ML Q4-6 PRN PRN 02/07 1500 AC 
 
Hydroxide  PO   
 
Aripiprazole 10 MG DAILY 02/09 1000 AC 02/10
 
  PO   0742
 
Benztropine Mesylate 1 MG Q6P PRN 02/07 1500 AC 
 
  PO   
 
Benztropine Mesylate 1 MG Q6P PRN 02/07 1500 AC 
 
  IM   
 
Gabapentin 900 MG AT BEDTIME 02/09 2200 AC 02/09
 
  PO   2208
 
Gabapentin 300 MG Q4P PRN 02/08 1230 AC 02/10
 
  PO   0743
 
Haloperidol 5 MG Q6P PRN 02/07 1500 AC 
 
  PO   
 
Haloperidol 5 MG Q6P PRN 02/07 1500 AC 
 
  IM   
 
Levothyroxine Sodium 0.05 MG DAILY AC 02/08 0803 AC 02/10
 
  PO   0611
 
Lorazepam 2 MG Q6P PRN 02/07 1500 AC 
 
  IM   
 
Lorazepam 1 MG Q1P PRN 02/06 1430 AC 
 
  PO   
 
Lorazepam 2 MG Q1P PRN 02/06 1430 AC 
 
  PO   
 
Magnesium Hydroxide 30 ML AT BEDTIME PRN 02/07 1500 AC 
 
  PO   
 
Multivitamins 1 TAB DAILY 02/06 1432 AC 02/10
 
  PO   0742
 
Nicotine 2 MG Q2P PRN 02/07 1515 AC 
 
  PO   
 
 
Vital Signs
 
 
Date Time Temp Pulse Resp B/P B/P Pulse O2 O2 Flow FiO2
 
     Mean Ox Delivery Rate 
 
02/10 0809 97.7 76  133/84     
 
02/10 0750 97.7 76  133/84     
 
02/09 2003 98.2 93  133/90     
 
02/09 2000 98.2 93  133/90     
 
02/09 1550  93  141/96     
 
02/09 1536  93  141/96     
 
02/09 1233  79  143/70     
 
02/09 1219    143/90     
 
 
 
 
MSE:
GENERAL:  Alert and oriented x3, good eye contact, well-groomed, no apparent 
distress
SPEECH:  Moderate rate and volume, normal prosody, fluent 
MOTOR:  No tics, tremors, stereotypy, or abnormal movements
MOOD: "Excellent, except for the last 10 minutes."
AFFECT:     Calm, mood congruent, good range, non-labile, well related
THOUGHT PROCESS:  Logical, linear and goal-directed
THOUGHT CONTENT:  No SI/HI/AVH/SIB, no apparent grandiosity, paranoia, delusions
, obsessions, ruminations
COGNITION:  No apparent deficit in attention, memory or concentration
JUDGMENT:  fair
INSIGHT: fair
 
ASSESSMENT:  A 56-year-old single white male who was admitted on 2/6/18 for 
suicidal ideation. He--reportedly--wanted to jump in front of a train to kill 
himself or wanted to buy heroin to overdose. Reports that in January, while 
still in senior care, he started to plan out a suicide attempt, to overdose.  Today, 
patient continues to have intermittent vague SI although feels mood is much 
improved today over yesterday.  Tolerating medication adjustments, continue 
plan.  
 
Likely Diagnoses:
Major depression, recurrent, severe.
Alcohol use disorder.
Hepatitis C.
History of pancreatitis.
Hypothyroidism.
 
PLAN:
-maintain safety, vs tid, q15min checks
-continue meds, tolerating increase in gabapentin
-d/c CIWA
-Psychoeducation provided regarding thyroid and depression
-continue plan

## 2018-02-11 VITALS — DIASTOLIC BLOOD PRESSURE: 82 MMHG | SYSTOLIC BLOOD PRESSURE: 125 MMHG

## 2018-02-11 VITALS — DIASTOLIC BLOOD PRESSURE: 91 MMHG | SYSTOLIC BLOOD PRESSURE: 145 MMHG

## 2018-02-11 VITALS — DIASTOLIC BLOOD PRESSURE: 79 MMHG | SYSTOLIC BLOOD PRESSURE: 124 MMHG

## 2018-02-11 VITALS — SYSTOLIC BLOOD PRESSURE: 142 MMHG | DIASTOLIC BLOOD PRESSURE: 92 MMHG

## 2018-02-11 NOTE — CP SOUTH PROGRESS NOTE PSYCH
Psych (Inpt) Progress Note
Progress Note
Progress Note:
 
 
SUBJECTIVE:  Patient having a difficult today.  Patient shared with writer that 
one of the other peers on the unit looks a lot like his brother who  2 years
ago.  Patient has filed found this to be triggering for jumpiness, anxiety, 
nervousness.  Patient also states he is having difficulty sleeping.  Requested 
having his Abilify dose split to 5 mg a.m. and p.m. to manage voices through the
night.  Patient tearful on interview.  Patient denies active SI, definitely 
having a low day today.  Patient also concerned because his father and 2 
brothers  at 57 years old and patient is currently 57.  No HI/AVH/SIB.  
Denies any signs or symptoms of withdrawal, no cravings.  Denies any side 
effects to medicines or abnormal movements.   Eating OK.  No pain.  Supportive 
therapy provided.
 
OBJECTIVE:  Per nursing, pt did well overnight without any acute events.  Pt 
remained in behavioral control, adherent with staff instructions and 
medications.  VSS.  
 
 Current Medications
 
 
  Sig/Leyda Start time  Last
 
Medication Dose Route Stop Time Status Admin
 
Acetaminophen 650 MG .STK-MED ONE 02/10 1202 DC 
 
  PO 02/10 1203  
 
Acetaminophen 650 MG Q6P PRN  1500 AC 02/10
 
  PO   1202
 
Al Hydroxide/Mg  30 ML Q4-6 PRN PRN  1500 AC 
 
Hydroxide  PO   
 
Aripiprazole 10 MG DAILY  1000 AC 
 
  PO   0805
 
Benztropine Mesylate 1 MG Q6P PRN  1500 AC 
 
  PO   
 
Benztropine Mesylate 1 MG Q6P PRN  1500 AC 
 
  IM   
 
Gabapentin 900 MG AT BEDTIME  2200 AC 02/10
 
  PO   2210
 
Gabapentin 300 MG Q4P PRN  1230 AC 
 
  PO   0805
 
Haloperidol 5 MG Q6P PRN  1500 AC 
 
  PO   
 
Haloperidol 5 MG Q6P PRN  1500 AC 
 
  IM   
 
Levothyroxine Sodium 0.05 MG DAILY AC  0803 AC 
 
  PO   0605
 
Lorazepam 2 MG Q6P PRN  1500 AC 
 
  IM   
 
Lorazepam 1 MG Q1P PRN  1430 AC 
 
  PO   
 
Lorazepam 2 MG Q1P PRN  1430 AC 
 
  PO   
 
Magnesium Hydroxide 30 ML AT BEDTIME PRN  1500 AC 
 
  PO   
 
Multivitamins 1 TAB DAILY  1432 AC 
 
  PO   0805
 
Nicotine 2 MG Q2P PRN  1515 AC 
 
  PO   
 
 
Vital Signs
 
 
Date Time Temp Pulse Resp B/P B/P Pulse O2 O2 Flow FiO2
 
     Mean Ox Delivery Rate 
 
 0740 97.4 69  124/79     
 
02/10 1948 98.4 83  117/72     
 
02/10 1548  89  136/76     
 
02/10 1150  78  121/81     
 
 
 
MSE:
GENERAL:  Alert and oriented x3, good eye contact, well-groomed, tearful at 
appropriate times and interview discussing his  brother.  
SPEECH:  Moderate rate and soft volume, normal prosody, fluent 
MOTOR:  No tics, tremors, stereotypy, or abnormal movements
MOOD: "Nervous and jumpy today."
AFFECT:   low, mood congruent, mildly constricted range, non-labile, well 
related
THOUGHT PROCESS:  Logical, linear and goal-directed
THOUGHT CONTENT:  No SI/HI/AVH/SIB, no apparent grandiosity, paranoia, delusion,
but thinking a great deal about his brother that he lost 2 years ago since one 
of the unit peers looks a lot like his brother.  
COGNITION:  No apparent deficit in attention, memory or concentration
JUDGMENT:  fair
INSIGHT: fair
 
ASSESSMENT:  A 56-year-old single white male who was admitted on 18 for 
suicidal ideation. He--reportedly--wanted to jump in front of a train to kill 
himself or wanted to buy heroin to overdose. Reports that in January, while 
still in custodial, he started to plan out a suicide attempt, to overdose.  Today, 
patient is quite dysphoric and anxious with intrusive memories of his  
brother.
 
Likely Diagnoses:
Major depression, recurrent, severe.
Alcohol use disorder.
Hepatitis C.
History of pancreatitis.
Hypothyroidism.
 
PLAN:
-maintain safety, vs tid, q15min checks
-continue meds, tolerating increase in gabapentin
-CIWA d/c'ed on Sat
-change abililfy 10 mg qam to 5 mg BID to cover nighttime AVH
-start atarax 25 mg po q6h prn anxiety
-continue plan

## 2018-02-12 VITALS — SYSTOLIC BLOOD PRESSURE: 123 MMHG | DIASTOLIC BLOOD PRESSURE: 79 MMHG

## 2018-02-12 VITALS — DIASTOLIC BLOOD PRESSURE: 89 MMHG | SYSTOLIC BLOOD PRESSURE: 139 MMHG

## 2018-02-12 VITALS — SYSTOLIC BLOOD PRESSURE: 140 MMHG | DIASTOLIC BLOOD PRESSURE: 90 MMHG

## 2018-02-12 VITALS — DIASTOLIC BLOOD PRESSURE: 95 MMHG | SYSTOLIC BLOOD PRESSURE: 150 MMHG

## 2018-02-12 NOTE — CP SOUTH PROGRESS NOTE PSYCH
Psych (Inpt) Progress Note
Progress Note
Include the following elements, when applicable:
Involvement in the active treatment of the patient with behavioral observations 
of the patient and the patient's response to the treatment.
Review of the ongoing treatment process in the context of the treatment plan.
Indication of how multi-disciplinary staff members are carrying out the 
treatment plan.
Plans for future interventions and recommendations for revision of the treatment
plan.
Liaison with other physicians/providers.
Progress Note:
Dr. Gharaibeh's and Dr. Morgan's notes reviewed.  Case and treatment plan 
discussed in team meeting.  Staff reports that the patient is denying suicidal 
ideation.  Wife reportedly called last evening and by report was intoxicated.  
Patient ignored the calls.  Staff reports patient has intermittent suicidal 
ideation.  [facility]  Patient is trying to get
on Social Security disability.  He had a screening with [facility].
 
Patient seen at 10:51 AM.  He was in group prior to meeting with me in office.  
States he is so-so today.  Feeling pretty good.  Reports he is having ups and 
downs.  States that he gets jitters and flinches when people walk by, behind his
back.  Finds hydroxyzine helpful.  Reports working on getting on Social 
Security.  Rates anxiety 7-8/10 with racing and jittering.  Rates sad mood 4-5/
10.  Denies feeling hopeless now but did feel this way over the weekend.  Denies
feeling helpless or worthless.  Reports he always feels guilty.  Denies active 
and passive suicidal ideation.  Denies homicidal ideation.  Reports command 
auditory hallucinations that tell him to hurt himself, to take off from here and
tell him that no one cares.  Voices are 2+ in number and are inside his head.  
They are more evident to him when it is quiet.  Reports visual hallucinations of
a picture, a black face, shadows.  Denies paranoid ideation.  Describes sleep is
so-so, appetite is good and energy is so-so.  Tolerating medications well, 
without complaint.
 
IMPRESSION:
Slow progress.  Continue present treatment plan.  Placement remains problematic.
 Monitor response to Abilify 5 mg twice daily, gabapentin 900 mg nightly, prn 
hydroxyzine and prn gabapentin.

## 2018-02-12 NOTE — SOCIAL WORKER PROG NOTE PSYCH
Social Work Progress Note
Progress Note
Sawyer reports that he had an up and down weekend.  Saturday mood was more down and
depressed, but yesterday was better.  He has been focused on applying for Social
Security and has a phone screening today at 2:30pm.  He has been trying to 
obtain past psychiatric records from [facility] and [facility].  
Shared that he continues to have some on and off SI, but states it is managable 
and he feels safe here.  He told me that his ex-fiance called the nursing 
station number a couple of times over the weekend.  He thinks she was probably 
drunk.  I reminded him that due to the restraining order he cannot accept any 
calls from her even if she initiates them as he could be seen as violating the 
protective order.  
Had a phone screening with Social Security today.  Not sure the outcome of the 
call at this time.  Has a screening with [employer]. tomorrow at 11am.

## 2018-02-13 VITALS — SYSTOLIC BLOOD PRESSURE: 125 MMHG | DIASTOLIC BLOOD PRESSURE: 99 MMHG

## 2018-02-13 VITALS — SYSTOLIC BLOOD PRESSURE: 128 MMHG | DIASTOLIC BLOOD PRESSURE: 82 MMHG

## 2018-02-13 VITALS — DIASTOLIC BLOOD PRESSURE: 79 MMHG | SYSTOLIC BLOOD PRESSURE: 142 MMHG

## 2018-02-13 VITALS — SYSTOLIC BLOOD PRESSURE: 132 MMHG | DIASTOLIC BLOOD PRESSURE: 92 MMHG

## 2018-02-13 NOTE — SOCIAL WORKER PROG NOTE PSYCH
Social Work Progress Note
Progress Note
Pt completed the screening intake for [facility] he was not clear on a plan for 
his [id number] but states they took all the information. Belinda is having 
their MD read the EKG results, there is an abnormality, they are still 
interested, [facility] needs to be called to confirmthis level is appropriate for the 
pt. Pt denies si/hi/ah/vh, and feels he would be ready for [facility] if
that were an option, "in a few days", I asked him if he would feel ready by 
Friday and he agreed.

## 2018-02-13 NOTE — CP SOUTH PROGRESS NOTE PSYCH
Psych (Inpt) Progress Note
Progress Note
Include the following elements, when applicable:
Involvement in the active treatment of the patient with behavioral observations 
of the patient and the patient's response to the treatment.
Review of the ongoing treatment process in the context of the treatment plan.
Indication of how multi-disciplinary staff members are carrying out the 
treatment plan.
Plans for future interventions and recommendations for revision of the treatment
plan.
Liaison with other physicians/providers.
Progress Note:
Case and treatment plan discussed in team meeting.  Staff reports that the 
patient is denying suicidal ideation.
 
Patient seen at 10:44 AM.  He reports he is doing well.  Affect is calm and 
euthymic.  Reports for the past couple days, he has been feeling happy and full 
of energy.  Patient anticipates making a phone call in about 10 minutes with 
[facility] in [place].  Rates sad mood 2-3/10.  He felt a bit down because a 
peer on the unit reminded patient of patient's  brother and patient had 
a flashback, but he reports he got over it pretty quickly.  Rates anxiety 5/10 
due to uncertainty around several plans.  Denies feeling hopeless, helpless or 
worthless.  Reports he always feels guilty.  Denies suicidal and homicidal 
ideation.  Reports voices persist but are significantly reduced, now down to 
just once or twice a day, in the middle of the night.  Reports he had visual 
hallucinations yesterday of bugs but none now.  Denies paranoid ideation.  
Reports he slept 4-5 hours.  Appetite is good.  Reports he is full of energy.  
Tolerating current medications well.  Agrees to increase Abilify dose to 5 mg 
every morning and 10 mg nightly.
 
IMPRESSION:
Slow progress.  Continue present treatment plan.  Anticipate likely discharge 
before the weekend.  Monitor response to increase in Abilify dose.

## 2018-02-14 VITALS — SYSTOLIC BLOOD PRESSURE: 150 MMHG | DIASTOLIC BLOOD PRESSURE: 85 MMHG

## 2018-02-14 VITALS — DIASTOLIC BLOOD PRESSURE: 82 MMHG | SYSTOLIC BLOOD PRESSURE: 133 MMHG

## 2018-02-14 VITALS — DIASTOLIC BLOOD PRESSURE: 90 MMHG | SYSTOLIC BLOOD PRESSURE: 129 MMHG

## 2018-02-14 VITALS — DIASTOLIC BLOOD PRESSURE: 83 MMHG | SYSTOLIC BLOOD PRESSURE: 141 MMHG

## 2018-02-14 NOTE — SOCIAL WORKER PROG NOTE PSYCH
Social Work Progress Note
Progress Note
Called [facility] and left a message with North (manager) to 
discuss Sawyer's referral.
Sawyer talked this morning about how he is doing "so, so."  Reports that he was 
"happy go wandy" and kind of energized the last 3 days.  He said the increase in
the Ablify should be helping.  Asked how his screening went with [facility]?  He 
said it took a long time and he doesn't have an answer yet due to it needing to 
be reviewed by the screening team first.  He said he tried to call [facility], 
but didn't reach anyone yesterday.  I asked him if he would be safe to leave 
today if a bed was available at [facility]?  He said he would.  His 
preference would be the [facility], but would not turn down a bed in [facility]. He also mentioned possibly going into a shelter from [facility] 
if the rehab plan didn't work out.   I asked how his screening went with [employer] 
[employer]?  He said that they will review it in [place] and most likely have an 
answer for him in 2 months.  He is working with an  and is hopeful about
the outcome.  
 
A screening was done with [facility] from the [facility].  She 
will admit Sawyer tomorrow if we can refax the d/c agreement stating he is open to 
a shelter if he can't get into rehab.  She would also like a referral faxed to 
[facility].
Sawyer was open to the referral to [facility] and signed a release.

## 2018-02-14 NOTE — CP SOUTH PROGRESS NOTE PSYCH
Psych (Inpt) Progress Note
Progress Note
Include the following elements, when applicable:
Involvement in the active treatment of the patient with behavioral observations 
of the patient and the patient's response to the treatment.
Review of the ongoing treatment process in the context of the treatment plan.
Indication of how multi-disciplinary staff members are carrying out the 
treatment plan.
Plans for future interventions and recommendations for revision of the treatment
plan.
Liaison with other physicians/providers.
Progress Note:
Case and treatment plan discussed in team meeting.  Staff reports that the 
patient is denying suicidal ideation.  He seemed upset this morning.  Told 
 that he was feeling so-so today.
 
Patient seen at 10:01 AM.  Reports he is doing pretty well.  He is pleased with 
going to [facility] in [place] if a bed becomes available.  Affect is 
calm and blunted.  Reports mood is pretty good, just a little sluggish, and he 
thinks this could be due to increase in Abilify dose as of yesterday.  Rates sad
mood 0/10 but he feels a little upset about leaving soon.  Rates anxiety about 3
-4/10.  Denies feeling hopeless, helpless or worthless.  Reports he always feels
guilty.  Denies active and passive suicidal ideation.  Denies homicidal 
ideation.  Reports auditory hallucinations but they are not too bad and are 
tapering down.  He has command auditory hallucinations to hurt himself or to get
up and leave, but he states he will not act on these voices.  Denies visual 
hallucinations and paranoid ideation.  Reports he is getting his usual 4-5 hours
of sleep at night and he is used to it.  Appetite is described as good.  Energy 
is rated at 3-4/10 with 10 being best.  Tolerating current medications well.
 
IMPRESSION: 
Slow progress.  Continue present treatment plan.  Anticipate likely discharge 
within the next couple of days to [facility].

## 2018-02-15 VITALS — SYSTOLIC BLOOD PRESSURE: 146 MMHG | DIASTOLIC BLOOD PRESSURE: 89 MMHG

## 2018-02-15 NOTE — CP SOUTH PROGRESS NOTE PSYCH
Psych (Inpt) Progress Note
Progress Note
Include the following elements, when applicable:
Involvement in the active treatment of the patient with behavioral observations 
of the patient and the patient's response to the treatment.
Review of the ongoing treatment process in the context of the treatment plan.
Indication of how multi-disciplinary staff members are carrying out the 
treatment plan.
Plans for future interventions and recommendations for revision of the treatment
plan.
Liaison with other physicians/providers.
Progress Note:
Case and treatment plan discussed in team meeting.  Patient has been accepted to
[facility] for [facility].  Staff reports that the patient is doing
well.  We are looking into a [facility] referral.  Will need 30 days Rx's from us.
 
Patient seen at 11:37 a.m.  Reports he is doing okay but has been getting 
flashbacks of his  brother because a peer on the unit resembles brother.
 Affect is mildly depressed.  Wants to go to [facility].  Mood: "I feel 
good.  I'm looking forward to moving."  Sad 3/10.  Anxiety 6/10.  Denies feeling
hopeless, helpless or worthless.  Always feels guilty.  Denies active and 
passive SI.  Denies HI.  Reports AHs have tapered off very much.  Has not had 
CAHs x 2 days.  Denies having VHs today.  Denies PI.  Reports sleep was usual, 3
hours last night (ranges 3-5 hours/night).  Appetite: good.  Energy: "full of it
today."  Tolerating medications well, without complaint.  Patient plans to 
arrange his own follow up for Hep C treatment and for hypothyroidism (he will be
in [place]).  Feels ready and safe for discharge.  Reports some urinary 
hesistancy, not new, for which he received tamsulosin in [facility].  Patient was 
advised to obtain outpatient follow up care for urinary concern.
 
IMPRESSION:
Condition improved.  Okay for discharge today with follow up as above.

## 2018-02-15 NOTE — SOCIAL WORKER PROG NOTE PSYCH
Social Work Progress Note
Progress Note
Spoke with [facility] at [facility].  She asked that we call [facility] and 
schedule an IOP intake with them.  I told her we would try, but I couldn't 
guarentee they would take them.  
Called Shira Amos at [facility], she said she would accept the referral, but couldn
't give an intake until the week of 2/26.  She will call me back with an appt. 
time after she is in receipt of the referral.  
Jeniffer Matos (MSW student) submitted the referral to [facility].  Submitted 
referral to [facility] as well.  
Scheduled [facility] to transport Sawyer to [facility] for 1pm.  Sawyer is ready to go today.  He
is aware of the referrals in place.  He was also informed that [facility] denied 
him today, stating they were concerned about reported symptoms of auditory 
hallucinations.  
Shira Trinidad called back with an intake for 2/26 at 9am.  I also informed Cindy 
at [facility] that he was discharging today.  She will take him on Tuesday if he 
is approved with [facility] and their medical team.

## 2018-02-15 NOTE — DISCHARGE SUMMARY REPORT-PSYCH
Visit Information
 
Visit Dates/Diagnosis'
Admission Date:
18
 
Discharge Date: 02/15/18
Reason for Admission:
Suicidal ideation.  Wanted to jump in front of a
train to kill himself or wanted to buy heroin with
which to overdose.
Psy Discharge Primary Diag: Valente depr rec severe
Psy Discharge Secondary Diag: Alcohol use disorder Hepatitis C Hypothyroidism Hx
pancreatitis
 
Hospital Course
Significant Lab Findings:
Case and treatment plan discussed in team meeting.  Staff reports that the 
patient is irritable.  Family meeting with father was scheduled for 10:30 a.m. 
and with mother for 1 pm.
 
Patient seen at 10:40 a.m. in family meeting with father and AUNG Hernandez.  
Patient's left eye was initially droopy consistent with sedation.  This droop 
cleared as she woke up.  Affect is irritable and patient uses some foul language
but overall she is more measured.  Father feels she is doing better.  Patient is
annoyed because her SW, Bryanna, is out sick.  Feels a little angry.  Denies 
feeling sad or worried.  Reports she was sleeping and feels tired.  Denies 
feeling hopeless, helpless, worthless or guilty.  Denies active and passive SI. 
Denies HI, AH, VH and PI but she likely has some AHs and continues to display 
some paranoia, "I heard you say immaculate!"  Reports her medications are fine 
but states she is eating and sleeping a lot.  Feels ready and safe for 
discharge.
 
IMPRESSION:
Symptomatic but significantly improved.  I joined family meeting with patient, 
mother and Mary HORAN, around 1:10 pm.  Mother is comfortable accepting 
patient home.  We are working on a treatment contract with patient prior to her 
discharge.
 
Plan is for discharge today to home, mother and brother with visiting nurse 
services and follow up with CARMELA Noe.
 
 
Course
Complications:
None.
Consultations:
Patient was seen for admission H&P by Dr. Jamison, who noted:
"Assessment:
57-year-old male with past history significant for hepatitis C, depression, 
anxiety, alcohol use admitted to Inpatient Psychiatry suicidal ideation.  
Patient has attempted to jump in front of the train as well as doing heroine 
overdose.
He has abnormal thyroid function.  Recommend endocrinology consult.  Patient 
currently on Ativan and thiamine.  We leave the rest of the psych management up 
to psychiatry.
I will order endocrinology consult."
 
Dr. Ahmadi of endocrinology consult regarding hypothyroidism.  She recommended:
"1. start Levothyroxine 50 mcg daily;
2. repeat TFT in 6-8 weeks;
3. f/u in office after discharge and will order thyroid u.s as outpatient."
 
 
Allergies:
Coded Allergies:
NO KNOWN ALLERGIES (NONE 18)
 
Hospital Course/TX Response:
The patient was monitored on the unit for safety, psychosis and mood disorder.  
He participated in multi-modal treatments on the unit.  Abilify was ordered for 
AHs with dose tapered up to 5 mg qAM and 10 mg qhs.  Levothyroxine was ordered 
at 50 mcg daily.
 
Mood and affect have improved.  SI has remitted.  AHs have improved.  Progress 
note from date of discharge, 2/15/18:
 
Case and treatment plan discussed in team meeting.  Patient has been accepted to
[facility] for today.  Staff reports that the patient is doing
well.  We are looking into a [facility] referral.  Will need 30 days Rx's from us.
 
Patient seen at 11:37 a.m.  Reports he is doing okay but has been getting 
flashbacks of his  brother because a peer on the unit resembles brother.
 Affect is mildly depressed.  Wants to go to [facility].  Mood: "I feel 
good.  I'm looking forward to moving."  Sad 3/10.  Anxiety /10.  Denies feeling
hopeless, helpless or worthless.  Always feels guilty.  Denies active and 
passive SI.  Denies HI.  Reports AHs have tapered off very much.  Has not had 
CAHs x 2 days.  Denies having VHs today.  Denies PI.  Reports sleep was usual, 3
hours last night (ranges 3-5 hours/night).  Appetite: good.  Energy: "full of it
today."  Tolerating medications well, without complaint.  Patient plans to 
arrange his own follow up for Hep C treatment and for hypothyroidism (he will be
in [place]).  Feels ready and safe for discharge.  Reports some urinary 
hesistancy, not new, for which he received tamsulosin in prison.  Patient was 
advised to obtain outpatient follow up care for urinary concern.
 
IMPRESSION:
Condition improved.  Okay for discharge today with follow up as above.
 
Discharge HBIPS
- Tobacco Use Treatment Offered
Post DC Medications Offered: Script Given-See Med List
Post DC Tobacco Treatment Plan: [facility]
Program Appt Date: 18
Program Appt Time: 1600
- EtOH/Drug Use D/O Treatment Offered
Post DC Medications Offered: Med Not Indicated for D/O
Post DC EtOH/SubAbuse TX Plan: Other SubAbuse/Dual Pgm ([facility])
Program Appt Date: 18
Program Appt Time: 0900
 
Metabolic Screening
- Screen if on a Neuroleptic Medication
- Metabolic screening should include:
- Blood Pressure, BMI, Glucose or Hgb A1c, & a
- Lipid profile from within the past 365 days.
Metabolic Screening
() Not Applicable, patient not on a neuroleptic.
 
 OR
 
() Patient on a neuroleptic(s) .
     Enter below results for Hemoglobin A1C, 
     and lipid panel if obtained during the last 365 days.
 
BMI: 23.600     
 
Blood Pressure: 146/89
 
Laboratory Results From [facility] (If applicable):
[x]
 Lab
 
 
Cholesterol 280 MG/DL H 17 0030
 
Cholesterol/HDL Ratio 4 % 17 0030
 
HDL Cholesterol 77 mg/dL H 17 0030
 
Hemoglobin A1c 5.5 % 17 0030
 
LDL Cholesterol, Calc 167 mg/dL H 17 0030
 
Triglycerides 182 mg/dL H 17 0030
 
 
 
Discharge Instructions
 
General Discharge Information
Multiple Neuroleptics:
([x]) Not Applicable
      
     OR
   
 Document below three failed attempts at monotherapy, or a plan to taper to 
 monotherapy, or augmentation of Clozapine.
 ()
 
Discharge Diet Regular
Discharge Activity Normal
DC Disposition:
[facility] [place].
Referrals
Ordered Referrals
Provider Referral 02/15/18
For Groups:
[[facility]]
 
[facility]  
admission 2/15/18  
[address]  
[place], [place]  
[phone]
 
Provider Referral 18
For Groups:
[[facility]]
 
[facility]  
Intake 18 9am  
[address]   
[phone]
 
Provider Referral 18
For Groups:
Outpatient Psychiatry
 
[facility]  
18 4pm  
[address]   
[place] [address]
 
 
 
Prescriptions
Start taking the following new medications:
Nicotine (Nicorelief) 2 MG GUM
    2 Milligram ORAL EVERY 2 HOURS AS NEEDED as needed for nicotine craving
    Qty = 120
    No Refills
    Comments:
       Last Taken:NOT USED IN THE HOSPITAL
             Time:
 
Gabapentin (Gabapentin) 300 MG CAPSULE
    300 Milligram ORAL EVERY 4 HOURS AS NEEDED as needed for OFF-LABEL for Anx/
Agt/Insomnia
    Qty = 60
    No Refills
    Comments:
       Last Taken:2/15/18
             Time:0800
 
Gabapentin (Gabapentin) 300 MG CAPSULE
    3 Capsule ORAL AT BEDTIME
    Qty = 90
    No Refills
    Comments:
       Last Taken:18
             Time:2200
 
Aripiprazole (Abilify) 5 MG TABLET
    5 Milligram ORAL DAILY
    Qty = 30
    No Refills
    Comments:
       Last Taken:2/15/18
             Time:0800
 
Aripiprazole (Abilify) 10 MG TABLET
    10 Milligram ORAL AT BEDTIME
    Qty = 30
    No Refills
    Comments:
       Last Taken:18
             Time:2200
 
Levothyroxine Sodium (Synthroid) 50 MCG TABLET
    0.05 Milligram ORAL DAILY BEFORE BREAKFAST
    Qty = 30
    No Refills
    Comments:
       Last Taken:2/15/18
             Time:0600
 
Multivitamin (One Daily Multivitamin) 1 EACH TABLET
    1 Tablet ORAL DAILY
    Qty = 30
    No Refills
    Comments:
       Last Taken:2/15/18
             Time:0800
 
 
Other Inst/Recommendations See Dr. Ahmadi for f/u.  You will need repeat TFTs in 6
-8 wks& thyrd ultrasnd
Studies Pending at Discharge
None.
Copies To:
[facility]

## 2018-02-15 NOTE — SOCIAL WORKER PROG NOTE PSYCH
Social Work Progress Note
Faxed Referral(s) 1 
   Referred To: [facility]
   Transition of Care Documents sent: Health Summary
   Faxed to: [facility]
   Fax #: [phone]
   Faxed by: Kayla Lam
   Date faxed: 02/15/18
   Time Faxed: [phone]
Faxed Referral(s) 2 
   Referred To: [facility]
   Transition of Care Documents sent: Health Summary
   Faxed to: [facility]
   Fax #: [phone]
   Faxed by: Kayla Lam
   Date faxed: 02/15/18
   Time Faxed: [phone]

## 2018-02-15 NOTE — PATIENT DISCHARGE INSTRUCTIONS
Psych Discharge Inst
 
General Discharge Information
Reason for Admission:
Suicidal ideation.  Wanted to jump in front of a train to kill himself or wanted
to buy heroin with which to overdose.
Psy Discharge Primary Diag+ Valente depr rec severe
Psy Discharge Secondary Diag+ Alcohol use disorder Hepatitis C Hypothyroidism Hx
pancreatitis
Summary Tests/Major Procedures
 Lab
 
 
Thyroglobulin Antibody 17 U/mL 02/07/18 0640
 
Thyroid Peroxidase Ab 33 U/mL 02/07/18 0640
 
Serum Alcohol 245.0 MG/DL 02/02/18 2210
 
 Lab
 
 
ALT 81 U/L H 02/02/18 2210
 
AST 97 U/L H 02/02/18 2210
 
Anion Gap 21 H 02/02/18 2210
 
BUN 10 mg/dL 02/02/18 2210
 
Carbon Dioxide 25 mmol/L 02/02/18 2210
 
Chloride 100 mmol/L 02/02/18 2210
 
Cholesterol 280 MG/DL H 09/06/17 0030
 
Cholesterol/HDL Ratio 4 % 09/06/17 0030
 
Creatinine 0.8 mg/dL 02/02/18 2210
 
Estimated GFR > 60 ml/min 02/02/18 2210
 
Free T4 0.86 ng/dL 02/07/18 0640
 
Glucose 111 mg/dL H 02/02/18 2210
 
HDL Cholesterol 77 mg/dL H 09/06/17 0030
 
Hemoglobin A1c 5.5 % 09/06/17 0030
 
LDL Cholesterol, Calc 167 mg/dL H 09/06/17 0030
 
Potassium 4.2 mmol/L 02/02/18 2210
 
Sodium 146 mmol/L H 02/02/18 2210
 
TSH 1.120 uIU/mL 11/09/11 1337
 
TSH 6.590 uIU/mL H 09/06/17 0030
 
TSH 9.450 uIU/mL H 09/09/17 0650
 
TSH 2.830 uIU/mL 02/02/18 2210
 
TSH 8.910 uIU/mL H 02/07/18 0640
 
Thyroxine (T4) 14.6 ug/dL H 02/02/18 2210
 
Total Protein 8.5 g/dL H 02/02/18 2210
 
Total T3 2.25 ng/mL H 02/07/18 0640
 
Triglycerides 182 mg/dL H 09/06/17 0030
 
Absolute Granulocytes 8.2 /CUMM H 02/02/18 2210
 
Absolute Monocytes 0.9 /CUMM H 02/02/18 2210
 
Hct 49.5 % 02/02/18 2210
 
Hgb 16.8 G/DL 02/02/18 2210
 
MCH 33.4 PG H 02/02/18 2210
 
MCV 98.3 FL H 02/02/18 2210
 
Plt Count 182 /CUMM 02/02/18 2210
 
WBC 11.9 /CUMM H 02/02/18 2210
 
 
EKG 2/7/18 showed sinus rhythm @ 68, borderline left axis deviation, slower rate
since previous tracing, borderline EKG, , QTc 426.
Studies Pending at DC:
None.
 
Patient Instructions
Contact Information
Your Psychiatrist on [facility] was Arsenio Cisse MD
 
* If you are experiencing an emergency related to this hospitalization, please 
call [phone] to contact the treating psychiatrist or the psychiatrist-on-
call.
 
* To Request a copy of your medical records, please contact the Medical Records 
Department at [phone].
 
* To request results of studies pending at the time of discharge, please call 
[phone].
 
* Continue your Medications until directed to stop by your Healthcare provider.
 
General Medication Information
Please continue to take your new medications and your continued home medications
, unless otherwise indicated on your discharge medication list, or unless 
directed by your MD or APRN to stop them.
 
 
Special Instructions
Diet Regular
Activity Normal
Other Inst/Recommendations See Dr. Ahmadi for f/u.  You will need repeat TFTs in 6
-8 wks& thyrd ultrasnd
- Tobacco Use Treatment Offered
Post DC Medications Offered: Script Given-See Med List
Post DC Tobacco Treatment Plan: [facility]
Program Appt Date: 02/21/18
Program Appt Time: 1600
- EtOH/Drug Use D/O Treatment Offered
Post DC Medications Offered: Med Not Indicated for D/O
Post DC EtOH/SubAbuse TX Plan: Other SubAbuse/Dual Pgm ([facility] 
Referral)
Program Appt Date: 02/16/18
Program Appt Time: 0900 (TBA)
Metabolic Screening
() Not Applicable, patient not on a neuroleptic.
 
 OR
 
() Patient on a neuroleptic(s) .
     Enter below results for Hemoglobin A1C, 
     and lipid panel if obtained during the last 365 days.
 
BMI: 23.600     
 
Blood Pressure: 146/89
 
Laboratory Results From [facility] (If applicable):
Please see labs above.
 
 
Advance Directives
Does the Patient have Medical Advance Directives No/Refused further info
Does Pt have Psychiatric Advance Directives? No/Refused further info
Does Patient have a Designated Surrogate Decision Maker: No
Information About Psychiatric Advance Directives Provided? Refused
 
Discharge Plan
Post Hospital Treatment Plan:
Will live at [facility] in [place].
[facility] referral being made.

## 2018-07-08 ENCOUNTER — HOSPITAL ENCOUNTER (INPATIENT)
Dept: HOSPITAL 68 - ERH | Age: 58
LOS: 4 days | DRG: 133 | End: 2018-07-12
Admitting: INTERNAL MEDICINE
Payer: COMMERCIAL

## 2018-07-08 VITALS — SYSTOLIC BLOOD PRESSURE: 150 MMHG | DIASTOLIC BLOOD PRESSURE: 96 MMHG

## 2018-07-08 VITALS — SYSTOLIC BLOOD PRESSURE: 132 MMHG | DIASTOLIC BLOOD PRESSURE: 84 MMHG

## 2018-07-08 VITALS — DIASTOLIC BLOOD PRESSURE: 87 MMHG | SYSTOLIC BLOOD PRESSURE: 134 MMHG

## 2018-07-08 VITALS — DIASTOLIC BLOOD PRESSURE: 98 MMHG | SYSTOLIC BLOOD PRESSURE: 154 MMHG

## 2018-07-08 VITALS — WEIGHT: 196.56 LBS | HEIGHT: 70 IN | BODY MASS INDEX: 28.14 KG/M2

## 2018-07-08 VITALS — DIASTOLIC BLOOD PRESSURE: 100 MMHG | SYSTOLIC BLOOD PRESSURE: 140 MMHG

## 2018-07-08 VITALS — DIASTOLIC BLOOD PRESSURE: 87 MMHG | SYSTOLIC BLOOD PRESSURE: 143 MMHG

## 2018-07-08 VITALS — DIASTOLIC BLOOD PRESSURE: 88 MMHG | SYSTOLIC BLOOD PRESSURE: 140 MMHG

## 2018-07-08 DIAGNOSIS — K29.20: ICD-10-CM

## 2018-07-08 DIAGNOSIS — F41.9: ICD-10-CM

## 2018-07-08 DIAGNOSIS — E03.9: ICD-10-CM

## 2018-07-08 DIAGNOSIS — J96.01: Primary | ICD-10-CM

## 2018-07-08 DIAGNOSIS — F32.9: ICD-10-CM

## 2018-07-08 DIAGNOSIS — K76.0: ICD-10-CM

## 2018-07-08 DIAGNOSIS — F10.229: ICD-10-CM

## 2018-07-08 DIAGNOSIS — J96.02: ICD-10-CM

## 2018-07-08 DIAGNOSIS — N40.0: ICD-10-CM

## 2018-07-08 DIAGNOSIS — K31.1: ICD-10-CM

## 2018-07-08 DIAGNOSIS — B18.2: ICD-10-CM

## 2018-07-08 DIAGNOSIS — E87.0: ICD-10-CM

## 2018-07-08 DIAGNOSIS — F17.200: ICD-10-CM

## 2018-07-08 DIAGNOSIS — E87.2: ICD-10-CM

## 2018-07-08 LAB
ABSOLUTE GRANULOCYTE CT: 21.2 /CUMM (ref 1.4–6.5)
BASOPHILS # BLD: 0 /CUMM (ref 0–0.2)
BASOPHILS NFR BLD: 0 % (ref 0–2)
EOSINOPHIL # BLD: 0 /CUMM (ref 0–0.7)
EOSINOPHIL NFR BLD: 0 % (ref 0–5)
ERYTHROCYTE [DISTWIDTH] IN BLOOD BY AUTOMATED COUNT: 13.8 % (ref 11.5–14.5)
GRANULOCYTES NFR BLD: 88.3 % (ref 42.2–75.2)
HCT VFR BLD CALC: 50.8 % (ref 42–52)
LYMPHOCYTES # BLD: 1.1 /CUMM (ref 1.2–3.4)
MCH RBC QN AUTO: 33.7 PG (ref 27–31)
MCHC RBC AUTO-ENTMCNC: 34.5 G/DL (ref 33–37)
MCV RBC AUTO: 97.7 FL (ref 80–94)
MONOCYTES # BLD: 1.7 /CUMM (ref 0.1–0.6)
PLATELET # BLD: 219 /CUMM (ref 130–400)
PMV BLD AUTO: 7.8 FL (ref 7.4–10.4)
RED BLOOD CELL CT: 5.2 /CUMM (ref 4.7–6.1)
WBC # BLD AUTO: 24 /CUMM (ref 4.8–10.8)

## 2018-07-08 PROCEDURE — G0480 DRUG TEST DEF 1-7 CLASSES: HCPCS

## 2018-07-08 PROCEDURE — 2NASP: CPT

## 2018-07-08 PROCEDURE — 5A09457 ASSISTANCE WITH RESPIRATORY VENTILATION, 24-96 CONSECUTIVE HOURS, CONTINUOUS POSITIVE AIRWAY PRESSURE: ICD-10-PCS

## 2018-07-08 NOTE — HISTORY & PHYSICAL
Nancy CHAPMAN,Sharon 07/08/18 1611:
General Information and HPI
MD Statement:
I have seen and personally examined FE PAGE and documented this H&P.
 
The patient is a 57 year old M who presented with a patient stated chief 
complaint of [difficulty swallowing].
 
Source of Information: patient, EMS
Exam Limitations: no limitations
History of Present Illness:
Patient is a 55-year-old male with past medical history significant for 
hepatitis C, altered LFTs, anxiety/depression, active smoking, alcohol 
intoxication requiring multiple detoxifications, pancreatitis twice in the past 
presented to [facility] with acute onset difficulty in swallowing along with chest 
pain and abdominal pain.  Patient was recently incarcerated for 10 months 
followed by staying inpatient rehabilitation for 3 months.  He was discharged 
yesterday morning, got drunk 2-3-4 beers and took methadone overnight.  Today 
morning he suddenly experienced difficulty with swallowing both liquids and 
solids.  He reports significant nausea with chest pain/abdominal pain at the 
time of presentation.  However by the time of interview he denied any pain.  He 
is actively nauseous and experiencing dry heaving.
 
He smokes 1 pack per day for the past 40 years, drinks alcohol and had multiple 
detoxifications in the past, abused widen variety of drugs at different times in
his lifetime.
 
Stays alone. Had a brother in [place].
 
Allergies/Medications
Allergies:
Coded Allergies:
NO KNOWN ALLERGIES (NONE 02/06/18)
 
Home Med list
Amitriptyline HCl 50 MG TABLET   1 TAB PO QPM MENTAL HEALTH/SLEEP  (Reported)
Aripiprazole (Abilify) 10 MG TABLET   1 TAB PO BID MENTAL HEALTH  (Reported)
Gabapentin 300 MG CAPSULE   1 CAP PO TID PRN DEPRESSION/ANXIETY  (Reported)
Hydroxyzine HCl (hydrOXYzine HCl) 10 MG TABLET   1 TAB PO PRN ANXIETY  (Reported
)
Levothyroxine Sodium (Synthroid) (Unknown Strength) TABLET   (Unknown Dose) PO 
DAILY SUPPLEMENT  (Reported)
Tamsulosin HCl 0.4 MG CAP.ER.24H   1 CAP PO DAILY   (Reported)
 
Compliance With Home Meds: FAIR
 
Past History
 
Travel History
Traveled to [place] past 21 day No
 
Medical History
Neurological: NONE
EENT: NONE
Cardiovascular: NONE
Respiratory: NONE
Gastrointestinal: pancreatitis
Hepatic: hepatitis C, ELEVATED LIVER ENZYMES
Renal: NONE
Musculoskeletal: NONE
Psychiatric: anxiety, depression, insomnia, STRESS
Endocrine: NONE
Blood Disorders: NONE
Cancer(s): NONE
GYN/Reproductive: NONE
History of MRSA: No
History of VRE: No
History of CDIFF: No
Tetanus Vaccine: 08/05/17
 
Surgical History
Surgical History: non-contributory
 
Past Family/Social History
 
Family History
Relations & Conditions if any
MOTHER
  FH: diabetes mellitus
  FHx: stroke
FATHER
  FHx: stroke
BROTHER
  FH: heart attack
 
 
Psychosocial History
Where do you live? Home
Who Do You Live With? self
Services at Home: None
Smoking Status: Current Everyday Smoker
ETOH Use: heavy use
Illicit Drug Use: denies illicit drug use
Power of /HCP? yes
 
Review of Systems
 
Review of Systems
Constitutional:
Reports: see HPI. 
 
Exam & Diagnostic Data
Last 24 Hrs of Vital Signs/I&O
 Vital Signs
 
 
Date Time Temp Pulse Resp B/P B/P Pulse O2 O2 Flow FiO2
 
     Mean Ox Delivery Rate 
 
07/08 2155       Nasal 3.0L 
 
       Cannula  
 
07/08 2132 98.5 88 20 140/88  94   
 
07/08 2007       Nasal 3.0L 
 
       Cannula  
 
07/08 1927 98.2 94 20 140/100  95   
 
07/08 1851 98.6 99 18 148/96  93 Nasal 3.0L 
 
       Cannula  
 
07/08 1751 98.3 103 18 154/98     
 
07/08 1742 98.3 103 18 154/98  91 Nasal 3.0L 
 
       Cannula  
 
07/08 1550 98.3 108 18 150/96     
 
07/08 1545 98.3 108 18 150/96  92 Nasal 3.0L 
 
       Cannula  
 
07/08 1441      94 Nasal 3.0L 
 
       Cannula  
 
07/08 1421 98.7 100 18 134/87     
 
07/08 1420 98.7 100 18 134/87  95 Nasal 3.0L 
 
       Cannula  
 
07/08 1227 98.0 70 20 132/84     
 
07/08 1212 98.0 70 20 132/84  95 Nasal 3.0L 
 
       Cannula  
 
07/08 1047  100 20   94 Nasal 3.0L 
 
       Cannula  
 
07/08 1033      92 Nasal 3.0L 
 
       Cannula  
 
07/08 1030 97.7 95 20 143/87     
 
07/08 1030      92 Nasal 3.0L 
 
       Cannula  
 
07/08 0944 97.7 95 20 143/87  92 Nasal 3.0L 
 
       Cannula  
 
 
 Intake & Output
 
 
 07/08 1600 07/08 0800 07/08 0000
 
Intake Total 2100  
 
Output Total 500  
 
Balance 1600  
 
    
 
Intake, IV 2100  
 
Output, Urine 500  
 
Patient 81.647 kg  
 
Weight   
 
Weight Estimated  
 
Measurement   
 
Method   
 
 
 
 
Physical Exam
General Appearance Alert, Oriented X3, Mild Distress
Skin No Rashes, No Breakdown, plethoric
Skin Temp/Moisture Exam: Warm/Dry
HEENT Atraumatic, PERRLA, EOMI
Neck Supple, No JVD
Cardiovascular Normal S1, Normal S2, No Murmurs
Lungs Clear to Auscultation, Normal Air Movement
Abdomen Normal Bowel Sounds, distended, guarding throughout
Neurological Strength at 5/5 X4 Ext, Normal Tone, Sensation Intact
Extremities No Clubbing, No Cyanosis, No Edema
Vascular Normal Pulses
Last 24 Hrs of Labs/Riley:
 Laboratory Tests
 
07/08/18 2200:
Sodium Pending, Potassium Pending, Chloride Pending, Carbon Dioxide Pending, 
Anion Gap Pending, BUN Pending, Creatinine Pending, BUN/Creatinine Ratio Pending
, Lactic Acid Pending
 
07/08/18 1705:
pH 7.28 *L, pCO2 53  H, pO2 74  L, HCO3 24, ABG O2 Sat (Measured) 90.0  L, 
Carboxyhemoglobin 4.6, O2 Concentration % 3L, O2 Delivery Method N/C, Phlebotomy
Draw Site RIGHT RADIAL
 
07/08/18 1652:
D-Dimer High Sensitivty Cancelled
 
07/08/18 1453:
Urine Opiates Screen < 100, Methadone Screen > 735  H, Barbiturate Screen < 60, 
Ur Phencyclidine Scrn < 6.00, Amphetamines Screen < 100, U Benzodiazepines Scrn 
< 85, Urine Cocaine Screen < 50, Urine Cannabis Screen < 5.00, Urinalysis LIGHT 
H, Urine Color YEL, Urine Clarity HAZY  H, Urine pH 6.0, Ur Specific Gravity >= 
1.030, Urine Protein 30  H, Urine Ketones 15  H, Urine Nitrite NEG, Urine 
Bilirubin NEG, Urine Urobilinogen 0.2, Ur Leukocyte Esterase NEG, Ur Microscopic
SEDIMENT EXAMINED, Urine RBC 1-3, Urine WBC 10-15  H, Ur Epithelial Cells FEW, 
Urine Bacteria MOD  H, Hyaline Casts 10-15  H, Granular Casts 15-25  H, Urine 
Mucus FEW, Urine Hemoglobin LARGE  H, Urine Glucose NEG
 
07/08/18 1328:
Lactic Acid 2.2  H
 
07/08/18 1030:
Anion Gap 16, Estimated GFR > 60, BUN/Creatinine Ratio 17.5, Glucose 115  H, 
Lactic Acid 2.6  H, Calcium 8.8, Total Bilirubin 0.7,   H, ALT 98  H, 
Alkaline Phosphatase 99, Troponin I < 0.01, Total Protein 8.2, Albumin 4.6, 
Globulin 3.6, Albumin/Globulin Ratio 1.3, Lipase 44, D-Dimer High Sensitivty 886
 H, CBC w Diff MAN DIFF ORDERED, RBC 5.20, MCV 97.7  H, MCH 33.7  H, MCHC 34.5, 
RDW 13.8, MPV 7.8, Gran % 88.3  H, Lymphocytes % 4.5  L, Monocytes % 7.2, 
Eosinophils % 0, Basophils % 0, Absolute Granulocytes 21.2  H, Absolute 
Lymphocytes 1.1  L, Absolute Monocytes 1.7  H, Absolute Eosinophils 0, Absolute 
Basophils 0, Platelet Estimate ADEQUATE, Normocytic RBCs VERIFIED, Normochromic 
RBCs VERIFIED, Serum Alcohol 88.0
 Microbiology
07/08 1453  URINE ROUT: Urine Culture - RECD
07/08 1438  URINE ROUT: Urine Culture - CAN
                Cancelled: Cancelled via OE: NOT NEEDED
07/08 1202  BLOOD: Blood Culture - RECD
07/08 1158  BLOOD: Blood Culture - RECD
 
 
 
Diagnostic Data
EKG Results
NSR 
 
Assessment/Plan
Assessment:
Patient is 57-year-old with active smoking, significant alcohol consumption, 
polysubstance abuse recently discharged from inpatient rotation for alcoholism 
presented to [facility] with acute gastric upset after consuming alcohol.  Vital 
signs at presentation are significant for tachycardia.  Physical examination did
show mild crackles and significant guarding in the abdomen with tenderness to 
palpation.  Labs did show white count of 24, lactic acidosis, carbon dioxide 
narcosis on ABG.  Imaging with CT angiogram ruled out pulmonary embolus.  CT 
abdomen pelvis with IV contrast did show fluid distention of the stomach with 
abrupt transition point for gastric pylorus/duodenal bulb concerning for gastric
outlet obstruction.  Hepatic steatosis.
 
Differential
Acute gastritis/gastropathy after alcohol consumption
Gastric outlet obstruction
Hypoinflation of lungs secondary to gastric distention compressing the lungs
 
Acute dysphagia to fluids and liquids in the chest region suggestive of motility
disorder. Need to rule out obstruction in the setting of abdominal tenderness.
 
Problem list
1.  Acute alcohol-induced gastrointestinal upset
2.  Gastric outlet obstruction
3.  Acute hypoxic hypercarbic respiratory failure
4.  Active tobacco consumption
5.  History of Polysubstance abuse
6.  History of anxiety/depression
7. H/O BPH
 
Plan
 
Admit to general medicine floor
 
Sepsis in setting of partial gastric outlet obstruction
* Nothing by mouth
* Trend lactic acid
* IV fluids
* NG tube for decompression
* IV ceftriaxone
* CIWA diagnostic
* Serial abdominal exams
* Recheck CBC, BEP tomorrow
 
Acute hypoxic hypercarbic respiratory failure
40 pack a smoking history.  Smoked till yesterday.  ABG carbon dioxide retention
resulting in respiratory acidosis
* BiPAP
* Repeat ABG in 2 hours
* TRC/nebs
* Oxygen supplementation
* Ruled out PE with CT angiogram
* [facility] consult in a.m.
 
History of depression/anxiety
Patient was on gabapentin, aripiprazole 10 mg twice a day.
* On hold, continue once advancing diet.
 
Hypothyroidism
* Check TSH free T4
* Continue levothyroxine pending above
 
H/O BPH
* hold flomax
* continue once diet advanced
 
DVT prophylaxis
Subcutaneous Lovenox
 
CODE STATUS
Full code
 
As Ranked By This Provider
Problem List:
 1. Elevated lactic acid level
 
 2. Elevated WBC count
   Qualifiers
 Leukocytosis type: other Qualified Code: D72.828 - Other elevated white blood 
cell count
 
 3. Partial gastric outlet obstruction
 
 4. Low O2 saturation
 
 5. Acute respiratory failure with hypoxia and hypercarbia
 
 
Core Measures/Misc (9/17)
 
Acute Coronary Syndrome
ACS Diagnosis: No
 
Congestive Heart Failure
Congestive Heart Failure Diagnosis No
 
Cerebrovascular Accident
CVA/TIA Diagnosis: No
 
VTE (View Protocol)
VTE Risk Factors Acute Medical Illness
No Mechanical VTE Prophylaxis d/t N/A MechProphylax Ordered
No VTE Pharm Prophylaxis d/t NA PharmProphylax ordered
 
Sepsis (View protocol)
Sepsis Present: Yes
If YES complete Sepsis Event Note If YES complete Sepsis Event Note
 
 
Gerald Nguyen MD 07/08/18 1805:
Core Measures/Misc (9/17)
 
Sepsis (View protocol)
If YES complete Sepsis Event Note If YES complete Sepsis Event Note
 
Attending MD Review Statement
 
Attending Statement
Attending MD Statement: examined this patient, discuss w/resident/PA/NP, agreed 
w/resident/PA/NP, reviewed EMR data (avail), discussed with nursing, amended to 
note
Attending Assessment/Plan:
Patient seen and examined.  57-year-old male with history of hepatitis C, 
pancreatitis, alcoholism.  Presents with complaints of abdominal discomfort and 
vomiting.    In the emergency room he was found afebrile.  He was found to be 
hypoxic 80s on room air.  He was placed hypoinflated lungs without evidence of 
pneumonia.  He was referred to the medical service by the ER PA for management 
of pneumonia.
 
On examination in the emergency room we found the patient lethargic but oriented

dry heaving.  He complained of generalized abdominal discomfort.  Denied any 
sick contacts.
 
We requested a d-dimer which returned elevated.  We also requested an ABG which 
showed hypoxia and hypercapnia.
 
General appearance: Well-developed lethargic, not in acute respiratory distress.
HEENT: Anicteric, no pallor, pupils equal and reactive.
Neck: Supple with no jugular venous distention.
Heart: S1-S2 regular with no audible murmur.
Lungs: Adequate and symmetric air entry bilaterally with no added sounds.
Abdomen: Distended with normal bowel sounds.  Soft, generalized tenderness, mild
guarding.  Fullness in the right upper quadrant.
Extremities: No pedal edema.  No cyanosis.
Skin: Intact
 
 
EKG showed normal sinus rhythm with no ischemic changes.  First troponin was 
negative.
 
 
Problems:
1.  Acute hypercapnic and hypoxic respiratory failure.
2.  Rule out sepsis
3.  Transaminitis
4.  Substance abuse; urine toxicology positive for methadone
5.  Hepatitis C
6.  Hypernatremia
 
Plan:
-Admit to inpatient General medical service.
-Begin patient on BiPAP therapy for his CO2 retention.  Repeat blood gas 
following initiation of therapy.
-Bronchodilator therapy with albuterol/Atrovent every 4 hours.
-Obtain CT angiogram to rule out pulmonary embolism and further evaluate lung 
parenchyma to determine cause of his hypercapnia and hypoxia.  X-ray is not 
suggestive of pneumonia although it is possible that patient may have aspirated 
due to his somnolence.
-Patient will be started empirically on IV Rocephin.  Recommend CT abdomen and 
pelvis to rule out infectious intra-abdominal process.  If ascites is noted on 
CT scan would recommend diagnostic paracentesis.
-Recommend placing patient on CIWA scale with no coverage for now.
-Keep n.p.o.
-IV hydration with D5 half-normal saline at 1 25 cc an hour.
-Place on sliding scale coverage.
-DVT prophylaxis.
-Repeat serum chemistry.

## 2018-07-08 NOTE — CT SCAN REPORT
EXAMINATION:
CT ANGIOGRAM OF THE CHEST WITH CONTRAST (CT PULMONARY ANGIOGRAM FOR PE)
CT ABDOMEN PELVIS WITH CONTRAST
 
CLINICAL INFORMATION:
Presumptive Dx: sepsis on unknown origin, lactic acidosis, rule out acute
hepatitis. Rule out pulmonary embolus.
Signs   Symptoms: alcoholic, smoker, methadone abuse, leukocytosis, acidosis,
smoking history, alcohol history.
 
COMPARISON:
CT abdomen pelvis dated 07/06/2017 and chest radiograph dated 01/10/2017. CT
chest dated 04/23/2013
 
TECHNIQUE:
Prior to contrast administration, noncontrast localization images were
obtained. Subsequently, multidetector volumetric imaging was performed from
the thoracic inlet to below the diaphragms following the administration of 94
mL Optiray 320 intravenous contrast during the pulmonary arterial phase of
enhancement. Images were then obtained from the diaphragms through the
ischial tuberosities during the portal venous phase of enhancement.
No contrast reaction reported.
Sagittal, coronal, and MIP oblique sagittal reformatted images were obtained
on the CT workstation, uploaded to PACS, and reviewed.
Total exam dose-length product 1281 and 368 mGy-cm.
 
FINDINGS:
 
Chest:
QUALITY OF STUDY/CONTRAST BOLUS: Suboptimal due to poor bolus timing.
Contrast material is primarily in the pulmonary veins and systemic arterial
system at the time the scan, limiting assessment of the pulmonary arteries.
 
PULMONARY ARTERIES: No central or large lobar/proximal segmental pulmonary
emboli.
 
THORACIC AORTA: No aneurysm or dissection. Minimal calcific atherosclerosis.
 
LUNG: Mild dependent atelectasis is present in the upper and lower lobes.
Linear opacities at the bases may correspond to pleural parenchymal scarring
or linear platelike atelectasis. No focal consolidation. Central airways are
clear. No bronchiectasis.
 
PLEURA: No pleural effusion or pneumothorax.
 
MEDIASTINUM: Normal heart size. No pericardial effusion. No hilar or
mediastinal lymphadenopathy. No evidence of septal bowing or right heart
strain. The esophagus is fluid-filled, consistent with reflux. This extends
cephalad to the level of the thoracic inlet.
 
CHEST WALL/AXILLA: No axillary adenopathy.
 
OSSEOUS STRUCTURES: No acute or suspicious osseous abnormality.  Multiple old
healed left rib fractures are noted. No acute rib fractures are identified.
Mild multilevel degenerative disc disease is present in the thoracic spine.
No malalignment.
 
ABDOMEN/PELVIS:
LIVER, GALLBLADDER, BILIARY TREE: Relative hypoattenuation of the hepatic
parenchyma is most consistent with steatosis. A small calcification is
present in the right hepatic lobe posteriorly. The liver is normal in size
and shape. No focal hepatic lesion or biliary ductal dilatation is present.
The gallbladder is unremarkable with no evidence of radiopaque gallstones,
gallbladder wall thickening, or obvious pericholecystic inflammatory changes.
 
PANCREAS: Unremarkable.
 
SPLEEN: Unremarkable.
 
ADRENAL GLANDS: Normal.
 
KIDNEYS AND URETERS: The kidneys are normal in size, shape, and attenuation.
No hydronephrosis, hydroureter, or calculi seen. No perinephric stranding.
 
BLADDER: Markedly distended. No focal abnormalities.
 
GASTROINTESTINAL TRACT: The stomach is markedly distended and fluid-filled.
This fluid propagates proximally in the distal esophagus. There is a tiny
hiatal hernia. Small bowel is relatively decompressed. The fluid from the
stomach does not progress distal to the duodenal bulb.
 
Small bowel and colon are relatively decompressed. Bowel is normal in
thickness without surrounding fat stranding. Appendix is normal. No
intraperitoneal free fluid or free air.
 
ABDOMINAL WALL: No significant hernia is appreciated.
 
LYMPHOVASCULAR STRUCTURES: Mild calcific atherosclerosis in abdominal aorta.
No aneurysmal dilatation. No adenopathy.
 
PELVIC VISCERA: Prostate gland is enlarged.
 
OSSEOUS STRUCTURES: Mild degenerative disc disease in the lumbar spine.
Minimal osteophyte arthritis in the hips and SI joints. There is osteophytic
ankylosis of the right SI joint.
 
IMPRESSION:
1. No acute pulmonary findings. Specifically, no evidence of pulmonary
embolism.
2. Fluid distention of the stomach with an abrupt transition pointed the
gastric pylorus/duodenal bulb. This could be due to decreased gastric
motility or gastric outlet obstruction.
3. Reflux of fluid into the esophagus to the level of the thoracic inlet.
4. Prostatomegaly
5. Hepatic steatosis.
 
VTE: negative

## 2018-07-08 NOTE — RADIOLOGY REPORT
EXAMINATION:
XR PORTABLE CHEST
 
CLINICAL INFORMATION:
Wheezing. Low oxygen saturation.
 
COMPARISON:
01/10/2017
 
TECHNIQUE:
Portable frontal view of the chest was obtained.
 
FINDINGS:
Lungs are suboptimally evaluated due to hypoinflation. The hypoinflation
causes crowding of bronchovascular structures in the bases. No focal
consolidation is seen. No pulmonary edema or pleural effusion. The size of
the cardiac silhouette is accentuated by the portable technique and
hypoinflation. Skeletal findings include old, healed fracture of the mid
right clavicle and old, healed fractures of the left posterolateral fifth and
sixth ribs.
 
IMPRESSION:
 
1. Lungs are hypoinflated.
2. No evidence of pneumonia or pleural effusion.

## 2018-07-08 NOTE — SEPSIS EVENT NOTE
Sepsis Event Note
 
Severe Sepsis
Severe Sepsis Present: Yes
Severe Sepsis Actions Taken: Blood Cultures x2, Lactic Acid x2, IV Broad 
Spectrum Abx, IV Fluids- NS or LR
 
Septic Shock
Septic Shock Present: No
 
Event Note
Event Note:
Patient is 57-year-old with active smoking, significant alcohol consumption, 
polysubstance abuse recently discharged from inpatient rotation for alcoholism 
presented to [facility] with acute gastric upset after consuming alcohol.  Vital 
signs at presentation are significant for tachycardia.  Physical examination did
show mild crackles and significant guarding in the abdomen with tenderness to 
palpation.  Labs did show white count of 24, lactic acidosis, carbon dioxide 
narcosis on ABG.
 
Possible sepsis secondary to acute gastroenteritis, rule out obstruction/
perforation of bowels. Need to rule out ascites and SBP
 
Sepsis Focused Exam
Sepsis Cardiac Exam: Tachycardia
Sepsis Resp Exam: CTA
Sepsis Cap Refill Exam: <2 Sec
Sepsis Peripheral Pulse Exam: Normal
Sepsis Peripheral Pulse Location: Dorsalis Pedis
Sepsis Skin Exam (color): Normal for Ethnicity
Skin Temp/Moisture Exam: Warm/Dry

## 2018-07-08 NOTE — ED CARDIAC/CP/PALPITATIONS
History of Present Illness
 
General
Chief Complaint: General Adult
Stated Complaint: BIBA ABD PAIN, CHEST PAIN
Source: patient
Exam Limitations: poor historian, lethargic, during discussion patient would 
fall asleep
 
Vital Signs & Intake/Output
Vital Signs & Intake/Output
 Vital Signs
 
 
Date Time Temp Pulse Resp B/P B/P Pulse O2 O2 Flow FiO2
 
     Mean Ox Delivery Rate 
 
 1545 98.3 108 18 150/96  92 Nasal 3.0L 
 
       Cannula  
 
 1441      94 Nasal 3.0L 
 
       Cannula  
 
 1421 98.7 100 18 134/87     
 
07/08 1420 98.7 100 18 134/87  95 Nasal 3.0L 
 
       Cannula  
 
/ 1227 98.0 70 20 132/84     
 
07/08 1212 98.0 70 20 132/84  95 Nasal 3.0L 
 
       Cannula  
 
/ 1047  100 20   94 Nasal 3.0L 
 
       Cannula  
 
 1033      92 Nasal 3.0L 
 
       Cannula  
 
 1030 97.7 95 20 143/87     
 
07/08 1030      92 Nasal 3.0L 
 
       Cannula  
 
 0944 97.7 95 20 143/87  92 Nasal 3.0L 
 
       Cannula  
 
 
 
Allergies
Coded Allergies:
NO KNOWN ALLERGIES (NONE 18)
 
Triage Note:
PT BIBA FROM HOME FOR ABD PAIN, CHEST PAIN AND
 "I CAN'T SWALLOW". PT ADMITS TO DRINKING LAST
 NIGHT, H/O ETOH ABUSE AND PANCREATITIS. ARRIVES TO
 ED ALERT/SLEEPY. PT RECEIVED 324MG ASA BY EMS.
 CHANGED INTO GOWN, EKG IN PROGRESS, AWAITS
 PROVIDER EVAL.
Triage Nurses Notes Reviewed? yes
Onset: Abrupt
Duration: continues in ED
Timing: remote history ("has happened once before")
Quality/Severity: aching, difficult to swallow
Location: substernal
Activities at Onset: none
HPI:
57-year-old male brought in by ambulance with history of hepatitis C, 
pancreatitis, alcoholism.  Patient lethargic during initial examination, poor 
historian, falling asleep during discussion.  Patient reported just before 
coming to the hospital having chest/abdominal pain, difficulty with swallowing. 
Patient reports that he was just discharged from detox center and states last 
night he had about 5-6 beers.  He denies any other drug use.  He denies HI/SI.
(Bebo EVANS,Abida)
Reconcile Medications
Amitriptyline HCl 50 MG TABLET   1 TAB PO QPM MENTAL HEALTH/SLEEP  (Reported)
Aripiprazole (Abilify) 10 MG TABLET   1 TAB PO BID MENTAL HEALTH  (Reported)
Gabapentin 300 MG CAPSULE   1 CAP PO TID PRN DEPRESSION/ANXIETY  (Reported)
Hydroxyzine HCl (hydrOXYzine HCl) 10 MG TABLET   1 TAB PO PRN ANXIETY  (Reported
)
Levothyroxine Sodium (Synthroid) (Unknown Strength) TABLET   (Unknown Dose) PO 
DAILY SUPPLEMENT  (Reported)
Tamsulosin HCl 0.4 MG CAP.ER.24H   1 CAP PO DAILY   (Reported)
 
(Binu Villalta MD)
 
Past History
 
Travel History
Traveled to [place] past 21 day No
 
Medical History
Any Pertinent Medical History? see below for history
Neurological: NONE
EENT: NONE
Cardiovascular: NONE
Respiratory: NONE
Gastrointestinal: pancreatitis
Hepatic: hepatitis C, ELEVATED LIVER ENZYMES
Renal: NONE
Musculoskeletal: NONE
Psychiatric: anxiety, depression, insomnia, STRESS
Endocrine: NONE
Blood Disorders: NONE
Cancer(s): NONE
GYN/Reproductive: NONE
History of MRSA: No
History of VRE: No
History of CDIFF: No
Tetanus Vaccine: 17
 
Surgical History
Surgical History: non-contributory
 
Psychosocial History
Who do you live with Other (see notes)
Services at Home None
What is your primary language English
Tobacco Use: Current Daily Use
Daily Tobacco Use Amount/Type: => 5 Cigarettes daily
ETOH Use: heavy use
Illicit Drug Use: denies illicit drug use
 
Family History
Family History, If Any:
MOTHER
  FH: diabetes mellitus
  FHx: stroke
FATHER
  FHx: stroke
BROTHER
  FH: heart attack
 
Hx Contributory? No
(Abida Berumen)
 
Review of Systems
 
Review of Systems
Constitutional:
Reports: see HPI. 
EENTM:
Reports: no symptoms. 
Respiratory:
Reports: no symptoms. 
Cardiovascular:
Reports: see HPI. 
GI:
Reports: see HPI. 
Genitourinary:
Reports: no symptoms. 
Musculoskeletal:
Reports: no symptoms. 
Skin:
Reports: no symptoms. 
Neurological/Psychological:
Reports: see HPI. 
Hematologic/Endocrine:
Reports: no symptoms. 
Immunologic/Allergic:
Reports: no symptoms. 
All Other Systems: Reviewed and Negative
(Abida Berumen)
 
Physical Exam
 
Physical Exam
General Appearance: well developed/nourished, no apparent distress, lethargic
Head: atraumatic, normal appearance
Eyes:
Bilateral: normal appearance. 
Ears, Nose, Throat: hearing grossly normal, oral mucous membranes dry
Neck: normal inspection, supple, full range of motion
Respiratory: chest non-tender, accessory muscle use, crackles (right lower lobe)
, wheezing, 3L nasal cannual satting 89/90
Cardiovascular: regular rate/rhythm, normal peripheral pulses
Peripheral Pulses:
3+ radial (R), 3+ radial (L)
Gastrointestinal: normal bowel sounds, soft, non-tender
Extremities: normal inspection, normal range of motion
Neurologic/Psych: no motor/sensory deficits, awake, alert, oriented x 3, 
lethargic, falling asleep during visit
Skin: intact, normal color, diaphoresis
 
Core Measures
ACS in differential dx? Yes
CVA/TIA Diagnosis No
Sepsis Present: No
Sepsis Focused Exam Completed? No
(Bebo EVANS,Abida)
 
Progress
Differential Diagnosis: AMI, costochondritis, pancreatitis, pneumonia, PUD/GERD
Plan of Care:
 Orders
 
 
Procedure Date/time Status
 
Regular Diet  B Active
 
CBC WITHOUT DIFFERENTIAL 600 Active
 
BASIC ELECTROLYTES PLUS BUN&CR  06 Active
 
Regular Diet  D Complete
 
Add-on Test (ER Only)  1658 Active
 
ARTERIAL BLOOD GAS (GEN)  1652 Active
 
ARTERIAL BLOOD GAS (GEN)  1648 Active
 
D-DIMER  1648 Active
 
LACTIC ACID  1618 Active
 
OXYGEN SETUP (GEN)  1614 Active
 
Pathway - chart  1614 Active
 
House Staff  1614 Active
 
Patient Data  1614 Active
 
SOCIAL WORK CONSULT  1614 Active
 
Code Status  1614 Active
 
Add-on Test (ER Only)  1600 Active
 
Patient Data  1557 Active
 
OXYGEN SETUP (GEN)  1554 Active
 
Saline Lock  1554 Active
 
Admit to inpatient  1554 Active
 
Vital Signs  1554 Active
 
Activity/Ambulation  1554 Active
 
Code Status  1554 Complete
 
Add-on Test (ER Only)  1500 Active
 
CULTURE,URINE  1453 Active
 
URINALYSIS  1453 Complete
 
Perez, Insertion/Removal/Asses  1438 Complete
 
LACTIC ACID  1317 Complete
 
BLOOD CULTURE  1152 Active
 
Add-on Test (ER Only)  1033 Active
 
ETHANOL  1030 Complete
 
CIWA  1017 Active
 
URINE DRUGS OF ABUSE  1017 Complete
 
TROPONIN LEVEL  1017 Complete
 
LIPASE  1017 Complete
 
LACTIC ACID  1017 Complete
 
COMPREHENSIVE METABOLIC PANEL  1017 Complete
 
CBC WITHOUT DIFFERENTIAL  1017 Complete
 
EKG  0940 Active
 
VTE Mechanical Prophylaxis   UNK Active
 
Vital Signs   UNK Active
 
Intake & Output   UNK Active
 
CIWA   UNK Active
 
Activity/Ambulation   UNK Active
 
 
 Current Medications
 
 
  Sig/Leyda Start time  Last
 
Medication Dose  Stop Time Status Admin
 
Lorazepam 2 MG Q6  1800 UNVr 
 
(Ativan)     
 
Pantoprazole Sodium 40 MG DAILY  1633 UNVr 
 
(Protonix)     
 
Cyanocobalamin/ 1 BAG ONCE ONE  1615 AC 
 
Thiamine/Pyridoxine    0014  
 
(Vitamin in I.V.)     
 
Dextrose/Water 1,000 ML    
 
(D5W 1000)     
 
Dextrose/Sodium  1,000 ML Q6H  1615 UNVr 
 
Chloride     
 
(D5W-1/2 Normal      
 
Saline 1000ML)     
 
Lorazepam 1 MG Q1P PRN  1615 UNVr 
 
(Ativan)     
 
Sodium Chloride 1,000 ML BOLUS ONE  1615 CAN 
 
(Normal Saline 0.9%)    1714  
 
 
 Laboratory Tests
 
 
 
18 1705:
pH 7.28 *L, pCO2 53  H, pO2 74  L, HCO3 24, ABG O2 Sat (Measured) 90.0  L, 
Carboxyhemoglobin 4.6, O2 Concentration % 3L, O2 Delivery Method N/C, Phlebotomy
Draw Site RIGHT RADIAL
 
18 1652:
D-Dimer High Sensitivty Cancelled
 
18 1453:
Urine Opiates Screen < 100, Methadone Screen > 735  H, Barbiturate Screen < 60, 
Ur Phencyclidine Scrn < 6.00, Amphetamines Screen < 100, U Benzodiazepines Scrn 
< 85, Urine Cocaine Screen < 50, Urine Cannabis Screen < 5.00, Urinalysis LIGHT 
H, Urine Color YEL, Urine Clarity HAZY  H, Urine pH 6.0, Ur Specific Gravity >= 
1.030, Urine Protein 30  H, Urine Ketones 15  H, Urine Nitrite NEG, Urine 
Bilirubin NEG, Urine Urobilinogen 0.2, Ur Leukocyte Esterase NEG, Ur Microscopic
SEDIMENT EXAMINED, Urine RBC 1-3, Urine WBC 10-15  H, Ur Epithelial Cells FEW, 
Urine Bacteria MOD  H, Hyaline Casts 10-15  H, Granular Casts 15-25  H, Urine 
Mucus FEW, Urine Hemoglobin LARGE  H, Urine Glucose NEG
 
18 1328:
Lactic Acid 2.2  H
 
18 1030:
Anion Gap 16, Estimated GFR > 60, BUN/Creatinine Ratio 17.5, Glucose 115  H, 
Lactic Acid 2.6  H, Calcium 8.8, Total Bilirubin 0.7,   H, ALT 98  H, 
Alkaline Phosphatase 99, Troponin I < 0.01, Total Protein 8.2, Albumin 4.6, 
Globulin 3.6, Albumin/Globulin Ratio 1.3, Lipase 44, D-Dimer High Sensitivty 
Pending, CBC w Diff MAN DIFF ORDERED, RBC 5.20, MCV 97.7  H, MCH 33.7  H, MCHC 
34.5, RDW 13.8, MPV 7.8, Gran % 88.3  H, Lymphocytes % 4.5  L, Monocytes % 7.2, 
Eosinophils % 0, Basophils % 0, Absolute Granulocytes 21.2  H, Absolute 
Lymphocytes 1.1  L, Absolute Monocytes 1.7  H, Absolute Eosinophils 0, Absolute 
Basophils 0, Platelet Estimate ADEQUATE, Normocytic RBCs VERIFIED, Normochromic 
RBCs VERIFIED, Serum Alcohol 88.0
 Microbiology
 1453  URINE ROUT: Urine Culture - RECD
 1438  URINE ROUT: Urine Culture - CAN
                Cancelled: Cancelled via OE: NOT NEEDED
 1202  BLOOD: Blood Culture - RECD
 1158  BLOOD: Blood Culture - RECD
 
 
 
57 year old male with history of hepatitic C, pancreatitis, and alcoholism 
presenting with chest/abdominal pain and vomiting. Recently discharged from 
detox center, reported having drank alcohol last night but denied other drugs.  
Urine positive for methadone.
-Lactic acid elevated 2.6, and WBC 24 - started on empiric ceftriaxone IV s/p 
blood cultures
-CXR hypoinflated lungs
-Duoneb, acetaminophen, fluids, zofran re-ordered
-Bacteriuria - ordered uc, potentially the origin of patient's elevated WBC. 
-Admitting patient to [facility]. Spoke with hospitalist and MOD who accepted 
admission. 
Diagnostic Imaging:
Viewed by Me: Radiology Read.  Discussed w/RAD: Radiology Read. 
Radiology Impression: PATIENT: FE PAGE  MEDICAL RECORD NO: [id number] 
PRESENT AGE: 57  PATIENT ACCOUNT NO: [id number] : 62  LOCATION: [facility] 
ORDERING PHYSICIAN: Abida EVANS     SERVICE DATE:  EXAM TYPE: 
RAD - XRY-PORTABLE CHEST XRAY EXAMINATION: XR PORTABLE CHEST CLINICAL 
INFORMATION: Wheezing. Low oxygen saturation. COMPARISON: 01/10/2017 TECHNIQUE: 
Portable frontal view of the chest was obtained. FINDINGS: Lungs are 
suboptimally evaluated due to hypoinflation. The hypoinflation causes crowding 
of bronchovascular structures in the bases. No focal consolidation is seen. No 
pulmonary edema or pleural effusion. The size of the cardiac silhouette is 
accentuated by the portable technique and hypoinflation. Skeletal findings 
include old, healed fracture of the mid right clavicle and old, healed fractures
of the left posterolateral fifth and sixth ribs. IMPRESSION: 1. Lungs are 
hypoinflated. 2. No evidence of pneumonia or pleural effusion. DICTATED BY: 
Valentin Beaver MD  DATE/TIME DICTATED:18 :RAD.JOINER 
DATE/TIME TRANSCRIBED:18 CONFIDENTIAL, DO NOT COPY WITHOUT 
APPROPRIATE AUTHORIZATION.  <Electronically signed in Other Vendor System>      
                                                                                
SIGNED BY: Valentin Beaver MD 18 1203
Initial ED EKG: normal sinus rhythm
(Abida Berumen)
 
Departure
 
Departure
Disposition: STILL A PATIENT
Condition: Stable
Clinical Impression
Primary Impression: Elevated WBC count
Qualifiers:  Leukocytosis type: other Qualified Code: D72.828 - Other elevated 
white blood cell count
Secondary Impressions: Elevated lactic acid level, Low O2 saturation
Referrals:
Art Barnes (PCP/Family)
 
Departure Forms:
Customer Survey
General Discharge Information
 
Admission Note
Spoke With:
Gerald Nguyen MD
Documentation of Exam:
Documentation of any treatments & extenuating circumstances including Concerns 
Regarding Discharge (functional status, medication knowledge or non-compliance, 
living conditions, etc.) that warrant an admission rather than observation: [
possible aspiration pneumonia, elevated WBC and lactic acid, low o2 sat on 2L NC
]
 
(Abida Berumen)
 
PA/NP Co-Sign Statement
Statement:
ED Attending supervision documentation-
 
x I saw and evaluated the patient. I have also reviewed all the pertinent lab 
results and diagnostic results. I agree with the findings and the plan of care 
as documented in the PA's/NP's documentation. Completed detox yesterday drank 
beer awoke with abd pain and CP, hypoxic with leukocytosis:  pneumonia
 
[] I have reviewed the ED Record and agree with the PA's/NP's documentation.
 
[] Additions or exceptions (if any) to the PAs/NP's note and plan are 
summarized below:
[]
 
(Ambar CHAPMAN,Binu)
 
Critical Care Note
 
Critical Care Note
Critical Care Time: non-applicable
(Bebo EVANS,Abida)

## 2018-07-09 VITALS — SYSTOLIC BLOOD PRESSURE: 150 MMHG | DIASTOLIC BLOOD PRESSURE: 100 MMHG

## 2018-07-09 VITALS — SYSTOLIC BLOOD PRESSURE: 120 MMHG | DIASTOLIC BLOOD PRESSURE: 60 MMHG

## 2018-07-09 VITALS — SYSTOLIC BLOOD PRESSURE: 138 MMHG | DIASTOLIC BLOOD PRESSURE: 80 MMHG

## 2018-07-09 VITALS — SYSTOLIC BLOOD PRESSURE: 120 MMHG | DIASTOLIC BLOOD PRESSURE: 90 MMHG

## 2018-07-09 LAB
ABSOLUTE GRANULOCYTE CT: 10.6 /CUMM (ref 1.4–6.5)
BASOPHILS # BLD: 0 /CUMM (ref 0–0.2)
BASOPHILS NFR BLD: 0.2 % (ref 0–2)
EOSINOPHIL # BLD: 0 /CUMM (ref 0–0.7)
EOSINOPHIL NFR BLD: 0.1 % (ref 0–5)
ERYTHROCYTE [DISTWIDTH] IN BLOOD BY AUTOMATED COUNT: 13.7 % (ref 11.5–14.5)
GRANULOCYTES NFR BLD: 78.9 % (ref 42.2–75.2)
HCT VFR BLD CALC: 42.2 % (ref 42–52)
LYMPHOCYTES # BLD: 1.7 /CUMM (ref 1.2–3.4)
MCH RBC QN AUTO: 33.9 PG (ref 27–31)
MCHC RBC AUTO-ENTMCNC: 34.6 G/DL (ref 33–37)
MCV RBC AUTO: 97.8 FL (ref 80–94)
MONOCYTES # BLD: 1.1 /CUMM (ref 0.1–0.6)
PLATELET # BLD: 167 /CUMM (ref 130–400)
PMV BLD AUTO: 8.2 FL (ref 7.4–10.4)
RED BLOOD CELL CT: 4.31 /CUMM (ref 4.7–6.1)
WBC # BLD AUTO: 13.4 /CUMM (ref 4.8–10.8)

## 2018-07-09 NOTE — EVENT NOTE
Event Note
Event Note:
Called by resident and Dr. Lindsey to place NGT for GOO, as no members of medical 
staff able to place tube.  D/w RN, RN placed to R nare, 900cc immediate return 
brown bilious bile. Pt tolerated well.

## 2018-07-09 NOTE — RADIOLOGY REPORT
EXAMINATION:
FL UPPER GI SERIES
 
CLINICAL INFORMATION:
Retained fluid in stomach. Evaluate for gastric outlet obstruction.
 
COMPARISON:
CT images of the abdomen from 07/08/2018.
 
TECHNIQUE:
A single contrast upper GI series with fluoroscopy and spot imaging was
performed. A total of 60 mL of Gastroview contrast was instilled into the NG
tube. The patient was evaluated in upright and recumbent positions.
 
FLUOROSCOPY TIME:
1 minute, 8 seconds
 
NUMBER OF IMAGES:
10 images
 
FINDINGS:
A spot image centered on the esophagus was acquired prior to contrast
injection. The side-port of the NG tube was located in the distal esophagus
just above the level of the esophagogastric junction, and the tip of the tube
was in the region of the gastric fundus.
 
After injection of the Gastroview contrast through the NG tube, the contrast
filled the proximal stomach and esophagus esophagus (likely due to the
position of the side-port above the esophagogastric junction). No evidence of
distal esophageal stricture or esophageal mass.
 
The patient was then placed in a supine and right lateral decubitus position.
In the decubitus position, there was no delay in contrast passage into the
gastric antrum and duodenum.
 
The gastric rugal fold thickness was normal. The duodenal mucosal fold
pattern was normal. No evidence of a fixed duodenal filling defect or
stricture.
 
IMPRESSION:
Upper gastrointestinal series performed with Gastroview contrast shows normal
appearance of the stomach and duodenum. No evidence of gastric outlet
obstruction.

## 2018-07-09 NOTE — PN- GENERAL SURGERY
Surgical Brief Attending Note
Brief Attending Note:
Consult to follow. CT images reviewed. Doubt GOO. In addition to gastric 
distension, there is significant distension of the bladder. Suspect narcotic 
relationship. Recommend post void residual. Gastric decompression with NG. Upper
GI tomorrow.

## 2018-07-09 NOTE — CONS- GENERAL SURGERY
General Information and HPI
 
Consulting Request
Date of Consult: 07/09/18
Requested By:
Oneyda CHAPMAN,Juan J
 
Reason for Consult:
GOO
History of Present Illness:
Patient admitted to medical service with nausea an retching. Methadone intake. 
Recently completed rehab after incarceration with immediate relapse. NG placed 
overnight due to dysphagia and distension of stomach seen on CT. 
 
Allergies/Medications
Allergies:
Coded Allergies:
NO KNOWN ALLERGIES (NONE 02/06/18)
 
Home Med List:
Amitriptyline HCl 50 MG TABLET   1 TAB PO QPM MENTAL HEALTH/SLEEP  (Reported)
Aripiprazole (Abilify) 10 MG TABLET   1 TAB PO BID MENTAL HEALTH  (Reported)
Gabapentin 300 MG CAPSULE   1 CAP PO TID PRN DEPRESSION/ANXIETY  (Reported)
Hydroxyzine HCl (hydrOXYzine HCl) 10 MG TABLET   1 TAB PO PRN ANXIETY  (Reported
)
Levothyroxine Sodium (Synthroid) (Unknown Strength) TABLET   (Unknown Dose) PO 
DAILY SUPPLEMENT  (Reported)
Tamsulosin HCl 0.4 MG CAP.ER.24H   1 CAP PO DAILY   (Reported)
 
Current Medications:
 Current Medications
 
 
  Sig/Leyda Start time  Last
 
Medication Dose Route Stop Time Status Admin
 
Acetaminophen 1,000 MG ONCE ONE 07/08 1445 DC 07/08
 
N/A 1 UNIT IV 07/08 1459  1444
 
Acetaminophen 0 .STK-MED ONE 07/08 1445 DC 
 
  IV   
 
Albuterol Sulfate 3 ML Q4-PRN PRN 07/08 2200 AC 
 
  INH   
 
Albuterol Sulfate 3 ML ONCE ONE 07/08 1445 DC 07/08
 
  INH 07/08 1446  1441
 
Ceftriaxone Sodium 1,000 MG Q24H 07/09 1200 AC 07/09
 
  IV   1244
 
Cyanocobalamin/ 1 BAG ONCE ONE 07/08 1615 DC 07/08
 
Thiamine/Pyridoxine  IV 07/09 0014  2001
 
Dextrose/Water 1,000 ML    
 
Dextrose/Sodium  1,000 ML Q6H 07/08 1615 AC 07/09
 
Chloride  IV   0258
 
Heparin Sodium  5,000 UNIT Q8 07/08 2200 AC 07/09
 
(Porcine)  SC   1246
 
Ipratropium Bromide 2.5 ML ONCE ONE 07/08 1445 DC 07/08
 
  INH 07/08 1446  1441
 
Levothyroxine Sodium 0.05 MG DAILY AC 07/09 0700 AC 07/09
 
  PO   0549
 
Lorazepam 2 MG Q6 07/08 1800 DC 
 
  IV   
 
Lorazepam 0 .STK-MED ONE 07/08 1738 DC 
 
  .ROUTE   
 
Lorazepam 1 MG Q1P PRN 07/08 1615 DC 
 
  IV   
 
Ondansetron HCl 4 MG ONCE ONE 07/09 0715 DC 07/09
 
  IV 07/09 0716  0714
 
Ondansetron HCl 4 MG ONCE ONE 07/08 2015 DC 
 
  PO 07/08 2016  
 
Ondansetron HCl 0 .STK-MED ONE 07/08 1632 DC 
 
  .ROUTE   
 
Ondansetron HCl 4 MG ONCE ONE 07/08 1630 DC 07/08
 
  IV 07/08 1631  1636
 
Ondansetron HCl 4 MG ONCE ONE 07/08 1445 DC 07/08
 
  IV 07/08 1446  1444
 
Ondansetron HCl 0 .STK-MED ONE 07/08 1440 DC 
 
  .ROUTE   
 
Pantoprazole Sodium 0 .STK-MED ONE 07/08 1738 DC 
 
  IV   
 
Pantoprazole Sodium 40 MG DAILY 07/08 1633 AC 07/09
 
  IV   0837
 
Patient Medication  1 ED ONE ONE 07/09 0815 DC 07/09
 
Teaching  ED 07/09 0816  0840
 
Sodium Chloride 1,000 ML BOLUS ONE 07/08 1615 CAN 
 
  IV 07/08 1714  
 
Sodium Chloride 1,000 ML BOLUS ONE 07/08 1445 DC 07/08
 
  IV 07/08 1544  1444
 
 
 
 
Past History
 
Medical History
Blood Transfusion Hx: No
Neurological: NONE
EENT: NONE
Cardiovascular: NONE
Respiratory: NONE
Gastrointestinal: pancreatitis
Hepatic: hepatitis C, ELEVATED LIVER ENZYMES
Renal: NONE
Musculoskeletal: NONE
Psychiatric: anxiety, depression, insomnia, STRESS
Endocrine: NONE
Blood Disorders: NONE
Cancer(s): NONE
GYN/Reproductive: NONE
 
Surgical History
Pertinent Surgical History: non-contributory
 
Family History
Relations & Conditions If Any:
MOTHER
  FH: diabetes mellitus
  FHx: stroke
FATHER
  FHx: stroke
BROTHER
  FH: heart attack
 
 
Psychosocial History
Where Do You Live? Home
Who Do You Live With? self
Services at Home: None
Smoking Status: Current Everyday Smoker
ETOH Use: heavy use
Illicit Drug Use: denies illicit drug use
Power of /HCP? yes
 
Review of Systems
Review of Systems:
no cp, no verduzco, no abd pain. voiding slow. remainder 12 points neg
 
Exam & Diagnostic Data
Vital Signs and I&O
Vital Signs
 
 
Date Time Temp Pulse Resp B/P B/P Pulse O2 O2 Flow FiO2
 
     Mean Ox Delivery Rate 
 
07/09 1117      95 Nasal 3.0L 
 
       Cannula  
 
07/09 0844      96   
 
07/09 0800      96 Nasal 3.0L 
 
       Cannula  
 
07/09 0701  74  120/90  96 BIPAP  
 
07/09 0642  76    92   
 
07/09 0558 98.2 80 20 150/100  94 BIPAP  
 
07/09 0424  83    94   
 
07/09 0311 98.3 80 20 150/100  93 BIPAP  
 
07/09 0136  83    94   
 
07/09 0013  83    95   
 
07/09 0000       BIPAP  
 
07/08 2155       Nasal 3.0L 
 
       Cannula  
 
07/08 2132 98.5 88 20 140/88  94   
 
07/08 2007       Nasal 3.0L 
 
       Cannula  
 
07/08 1927 98.2 94 20 140/100  95   
 
07/08 1851 98.6 99 18 148/96  93 Nasal 3.0L 
 
       Cannula  
 
07/08 1751 98.3 103 18 154/98     
 
07/08 1742 98.3 103 18 154/98  91 Nasal 3.0L 
 
       Cannula  
 
07/08 1550 98.3 108 18 150/96     
 
07/08 1545 98.3 108 18 150/96  92 Nasal 3.0L 
 
       Cannula  
 
07/08 1441      94 Nasal 3.0L 
 
       Cannula  
 
07/08 1421 98.7 100 18 134/87     
 
07/08 1420 98.7 100 18 134/87  95 Nasal 3.0L 
 
       Cannula  
 
 
 Intake & Output
 
 
 07/09 1600 07/09 0800 07/09 0000 07/08 1600 07/08 0800 07/08 0000
 
Intake Total  3191 870 7027  
 
Output Total  1150  500  
 
Balance    
 
       
 
Intake, IV  7873 507 9608  
 
Output,  1000    
 
Gastric      
 
Drainage      
 
Output, Urine  150  500  
 
Patient   178 lb 180 lb  
 
Weight      
 
Weight   Reported by Patient Estimated  
 
Measurement      
 
Method      
 
 
 
Physical Exam:
gen; nad, looks age and normal habitus
 
HEENT: Anicteric PERRL EOMI
 
Abdomen: Soft nontender nondistended no mass no hernia
Last 24 Hours of Labs:
 Laboratory Tests
 
 
 07/09 07/09
 
 1045 0753
 
Blood Gas  
 
  pH (7.35 - 7.45 PH) 7.35 
 
  pCO2 (35 - 45 TORR) 56  H 
 
  pO2 (80 - 100 TORR) 69  L 
 
  HCO3 (21 - 28 MEQ/L) 30  H 
 
  ABG O2 Sat (Measured) (>96.0 %) 91.0  L 
 
  P-50 (Temp Corrected) N 
 
  Carboxyhemoglobin (1.5 - 5.0 %) 2.7 
 
  O2 Concentration % 3L 
 
  Temperature (97.0 - 100.0 FARH) 98.2 
 
  O2 Delivery Method N/C 
 
Chemistry  
 
  Sodium (137 - 145 mmol/L)  141
 
  Potassium (3.5 - 5.1 mmol/L)  4.0
 
  Chloride (98 - 107 mmol/L)  100
 
  Carbon Dioxide (22 - 30 mmol/L)  34  H
 
  Anion Gap (5 - 16)  8
 
  BUN (9 - 20 mg/dL)  14
 
  Creatinine (0.7 - 1.2 mg/dL)  0.8
 
  Estimated GFR (>60 ml/min)  > 60
 
  BUN/Creatinine Ratio (7 - 25 %)  17.5
 
Hematology  
 
  CBC w Diff  NO MAN DIFF REQ
 
  WBC (4.8 - 10.8 /CUMM)  13.4  H
 
  RBC (4.70 - 6.10 /CUMM)  4.31  L
 
  Hgb (14.0 - 18.0 G/DL)  14.6
 
  Hct (42 - 52 %)  42.2
 
  MCV (80.0 - 94.0 FL)  97.8  H
 
  MCH (27.0 - 31.0 PG)  33.9  H
 
  MCHC (33.0 - 37.0 G/DL)  34.6
 
  RDW (11.5 - 14.5 %)  13.7
 
  Plt Count (130 - 400 /CUMM)  167
 
  MPV (7.4 - 10.4 FL)  8.2
 
  Gran % (42.2 - 75.2 %)  78.9  H
 
  Lymphocytes % (20.5 - 51.1 %)  12.9  L
 
  Monocytes % (1.7 - 9.3 %)  7.9
 
  Eosinophils % (0 - 5 %)  0.1
 
  Basophils % (0.0 - 2.0 %)  0.2
 
  Absolute Granulocytes (1.4 - 6.5 /CUMM)  10.6  H
 
  Absolute Lymphocytes (1.2 - 3.4 /CUMM)  1.7
 
  Absolute Monocytes (0.10 - 0.60 /CUMM)  1.1  H
 
  Absolute Eosinophils (0.0 - 0.7 /CUMM)  0
 
  Absolute Basophils (0.0 - 0.2 /CUMM)  0
 
Miscellaneous  
 
  Phlebotomy Draw Site RIGHT RADIAL 
 
 
 
 
 07/09 07/08 07/08 07/08
 
 0110 2200 1705 1652
 
Blood Gas    
 
  pH (7.35 - 7.45 PH) 7.29 *L  7.28 *L 
 
  pCO2 (35 - 45 TORR) 56  H  53  H 
 
  pO2 (80 - 100 TORR) 61  L  74  L 
 
  HCO3 (21 - 28 MEQ/L) 26  24 
 
  ABG O2 Sat (Measured) (>96.0 %) 89.0  L  90.0  L 
 
  P-50 (Temp Corrected) Y   
 
  Carboxyhemoglobin (1.5 - 5.0 %) 2.6  4.6 
 
  O2 Concentration % 30%  3L 
 
  Temperature (97.0 - 100.0 FARH) 98.5   
 
  Respiration Rate (BPM) 22   
 
  O2 Delivery Method VISION-FFM  N/C 
 
  Vent Mode ST   
 
  Expiratory Pressure (CM H2O P) 4   
 
  Inspiratory Pressure (CM H2O P) 16   
 
Chemistry    
 
  Sodium (137 - 145 mmol/L)  143  
 
  Potassium (3.5 - 5.1 mmol/L)  5.1  
 
  Chloride (98 - 107 mmol/L)  103  
 
  Carbon Dioxide (22 - 30 mmol/L)  29  
 
  Anion Gap (5 - 16)  11  
 
  BUN (9 - 20 mg/dL)  14  
 
  Creatinine (0.7 - 1.2 mg/dL)  0.7  
 
  Estimated GFR (>60 ml/min)  > 60  
 
  BUN/Creatinine Ratio (7 - 25 %)  20.0  
 
  Lactic Acid (0.7 - 2.1 mmol/L)  1.2  
 
Coagulation    
 
  D-Dimer High Sensitivty    Cancelled
 
Miscellaneous    
 
  Phlebotomy Draw Site RIGHT RADIAL  RIGHT RADIAL 
 
 
 
 
 07/08 07/08
 
 1618 1453
 
Chemistry  
 
  Lactic Acid Cancelled 
 
Toxicology  
 
  Urine Opiates Screen (>2000 NG/ML)  < 100
 
  Methadone Screen (>300 NG/ML)  > 735  H
 
  Barbiturate Screen (>200 NG/ML)  < 60
 
  Ur Phencyclidine Scrn (>25 NG/ML)  < 6.00
 
  Amphetamines Screen (>1000 NG/ML)  < 100
 
  U Benzodiazepines Scrn (>200 NG/ML)  < 85
 
  Urine Cocaine Screen (>300 NG/ML)  < 50
 
  Urine Cannabis Screen (>50 NG/ML)  < 5.00
 
Urines  
 
  Urinalysis  LIGHT  H
 
  Urine Color (YEL,AMB,STR)  YEL
 
  Urine Clarity (CLEAR)  HAZY  H
 
  Urine pH (5.0 - 8.0)  6.0
 
  Ur Specific Gravity (1.001 - 1.035)  >= 1.030
 
  Urine Protein (NEG,<30 MG/DL)  30  H
 
  Urine Ketones (NEG)  15  H
 
  Urine Nitrite (NEG)  NEG
 
  Urine Bilirubin (NEG)  NEG
 
  Urine Urobilinogen (0.1  -  1.0 EU/dl)  0.2
 
  Ur Leukocyte Esterase (NEG)  NEG
 
  Ur Microscopic  SEDIMENT EXAMINED
 
  Urine RBC (0 - 5 /HPF)  1-3
 
  Urine WBC (0 - 2 /HPF)  10-15  H
 
  Ur Epithelial Cells (NONE,FEW)  FEW
 
  Urine Bacteria (NEG/NONE)  MOD  H
 
  Hyaline Casts (0/LPF)  10-15  H
 
  Granular Casts (NONE /LPF)  15-25  H
 
  Urine Mucus (FEW,NONE)  FEW
 
  Urine Hemoglobin (NEG)  LARGE  H
 
  Urine Glucose (N MG/DL)  NEG
 
 
 
Imaging Results:
CT scan of the abdomen pelvis was personally reviewed.  The finding show a 
distended stomach with small amount of liquid in distal esophagus.  There is 
distention of the bladder
 
Assessment/Plan
Assessment/Plan
Patient with recent relapse of alcohol and methadone abuse after completing 
rehabilitation.  Symptoms of retching and dysphasia related to distention of the
stomach. Findings on CT scan are that of gastric distention and bladder 
distention.  Both are likely related to narcotic ingestion.  Nasogastric 
decompression has been performed.  Recommend observation today followed by upper
GI series tomorrow.  If there is no obstruction seen on imaging, nasogastric 
tube can be discontinued and diet reinstated.  In regards to his bladder, 
recommend postvoid residual.  Consider Perez catheterization.
 
 
Consult Acknowledgment
- Thank you for your consult request.

## 2018-07-09 NOTE — PN- HOUSESTAFF
**See Addendum**
Subjective
Follow-up For:
Respiratory failure, gastric outlet obstruction
Complaints: light headedness, nausea
Subjective:
Patient was seen and examined at the bedside.  At the time of interview, nurse 
was changing out NG tube drainage, which he drained approximately 2 L of green 
brown nonbilious fluid.  Patient was actively receiving BiPAP.  Complained only 
of mild suprapubic pain, 4 out of 10.
 
Review of Systems
Constitutional:
Reports: no symptoms. 
Gastrointestinal:
Reports: abdominal pain (suprapubic, minor). 
 
Objective
Last 24 Hrs of Vital Signs/I&O
 Vital Signs
 
 
Date Time Temp Pulse Resp B/P B/P Pulse O2 O2 Flow FiO2
 
     Mean Ox Delivery Rate 
 
 1117      95 Nasal 3.0L 
 
       Cannula  
 
 0844      96   
 
 0800      96 Nasal 3.0L 
 
       Cannula  
 
 0701  74  120/90  96 BIPAP  
 
 0642  76    92   
 
 0558 98.2 80 20 150/100  94 BIPAP  
 
 0424  83    94   
 
 0311 98.3 80 20 150/100  93 BIPAP  
 
 0136  83    94   
 
 0013  83    95   
 
/ 0000       BIPAP  
 
 2155       Nasal 3.0L 
 
       Cannula  
 
 2132 98.5 88 20 140/88  94   
 
/2007       Nasal 3.0L 
 
       Cannula  
 
 1927 98.2 94 20 140/100  95   
 
/08 1851 98.6 99 18 148/96  93 Nasal 3.0L 
 
       Cannula  
 
 1751 98.3 103 18 154/98     
 
/ 1742 98.3 103 18 154/98  91 Nasal 3.0L 
 
       Cannula  
 
 1550 98.3 108 18 150/96     
 
07/08 1545 98.3 108 18 150/96  92 Nasal 3.0L 
 
       Cannula  
 
 1441      94 Nasal 3.0L 
 
       Cannula  
 
 1421 98.7 100 18 134/87     
 
07/08 1420 98.7 100 18 134/87  95 Nasal 3.0L 
 
       Cannula  
 
 1227 98.0 70 20 132/84     
 
07/08 1212 98.0 70 20 132/84  95 Nasal 3.0L 
 
       Cannula  
 
 
 Intake & Output
 
 
 / 1600 / 0800 07/ 0000
 
Intake Total  1650 500
 
Output Total  1150 
 
Balance  500 500
 
    
 
Intake, IV  1650 500
 
Output,  1000 
 
Gastric   
 
Drainage   
 
Output, Urine  150 
 
Patient   178 lb
 
Weight   
 
Weight   Reported by Patient
 
Measurement   
 
Method   
 
 
 
 
Physical Exam
General Appearance: Alert, Oriented X3, Cooperative, Mild Distress
Skin: No Rashes
Skin Temp/Moisture Exam: Warm/Dry
HEENT: Atraumatic, PERRLA, EOMI
Neck: Supple, No JVD, No thryomegaly
Cardiovascular: Regular Rate, Normal S1, Normal S2, No Murmurs
Lungs: Clear to Auscultation, Normal Air Movement, on BiPap
Abdomen: Soft, distended, 4/10 suprapubic pain
Neurological: Normal Speech, Strength at 5/5 X4 Ext, Normal Tone, Sensation 
Intact
Extremities: No Clubbing, No Cyanosis, No Edema, cold upper extremities; no 
discoloration
 
Assessment/Plan
Assessment:
57-year-old male with past medical history significant for hepatitis C, altered 
LFTs, anxiety/depression, active smoking, alcohol intoxication requiring 
multiple detoxes, pancreatitis who presented to [place] with acute onset 
difficulty in swallowing along with chest and abdominal pain.  Patient was 
recently incarcerated for 10 months following by staying inpatient 
rehabilitation for 3 months.  He was discharged yesterday morning, which point 
he drank 2-4 beers and took methadone overnight.  At that point he started to 
experience difficulty drinking liquids and solids.  CT angiogram ruled out 
pulmonary embolus.  CT abdomen with IV contrast did show fluid distention of the
stomach with abrupt transition point for gastric pylorus/duodenal bulb 
concerning for gastric outlet obstruction.  Hepatic steatosis noted.
 
Differential
Acute gastritis/gastropathy from alcohol consumption
Gastric outlet obstructionruled unlikely by surgery
Hypoinflation of lungs secondary to gastric distention compressing the lungs
 
Problem list/plan:
 
Alcohol induced gastrointestinal upset


 
Sepsis in the setting of partial gastric outlet obstruction?








 
Acute hypoxic hypercarbic respiratory failure

-TRC/nebs



 
Depression/anxiety

 
Hypothyroidism


 
BPH


 
Diabetes

 
DVT prophylaxis: Subcu Lovenox
Patient is full code
Problem List:
 1. Acute respiratory failure with hypoxia and hypercarbia
 
 2. Diminished gastric emptying
 
Pain Ratin
Pain Location:
suprapubic abdomen
Pain Goal: Pain 4 or less
Pain Plan:
Zofran to control nausea
Tomorrow's Labs & Rationales:
cbc to follow possible infection

## 2018-07-10 VITALS — SYSTOLIC BLOOD PRESSURE: 118 MMHG | DIASTOLIC BLOOD PRESSURE: 70 MMHG

## 2018-07-10 VITALS — SYSTOLIC BLOOD PRESSURE: 136 MMHG | DIASTOLIC BLOOD PRESSURE: 84 MMHG

## 2018-07-10 VITALS — DIASTOLIC BLOOD PRESSURE: 74 MMHG | SYSTOLIC BLOOD PRESSURE: 110 MMHG

## 2018-07-10 VITALS — SYSTOLIC BLOOD PRESSURE: 130 MMHG | DIASTOLIC BLOOD PRESSURE: 80 MMHG

## 2018-07-10 VITALS — DIASTOLIC BLOOD PRESSURE: 80 MMHG | SYSTOLIC BLOOD PRESSURE: 130 MMHG

## 2018-07-10 LAB
ABSOLUTE GRANULOCYTE CT: 7.7 /CUMM (ref 1.4–6.5)
BASOPHILS # BLD: 0 /CUMM (ref 0–0.2)
BASOPHILS NFR BLD: 0.4 % (ref 0–2)
EOSINOPHIL # BLD: 0.1 /CUMM (ref 0–0.7)
EOSINOPHIL NFR BLD: 1 % (ref 0–5)
ERYTHROCYTE [DISTWIDTH] IN BLOOD BY AUTOMATED COUNT: 13.4 % (ref 11.5–14.5)
GRANULOCYTES NFR BLD: 74.7 % (ref 42.2–75.2)
HCT VFR BLD CALC: 40.5 % (ref 42–52)
LYMPHOCYTES # BLD: 1.6 /CUMM (ref 1.2–3.4)
MCH RBC QN AUTO: 33.7 PG (ref 27–31)
MCHC RBC AUTO-ENTMCNC: 34.5 G/DL (ref 33–37)
MCV RBC AUTO: 97.5 FL (ref 80–94)
MONOCYTES # BLD: 0.9 /CUMM (ref 0.1–0.6)
PLATELET # BLD: 140 /CUMM (ref 130–400)
PMV BLD AUTO: 8.7 FL (ref 7.4–10.4)
RED BLOOD CELL CT: 4.16 /CUMM (ref 4.7–6.1)
WBC # BLD AUTO: 10.3 /CUMM (ref 4.8–10.8)

## 2018-07-10 NOTE — PN- HOUSESTAFF
**See Addendum**
Jono Lyn 07/10/18 0641:
Subjective
Follow-up For:
gastric obstruction 
Complaints: still feels "a little shaky"
Subjective:
Patient was seen and examined at the bedside. Overnight patient removed his own 
NG tube; surgery was notified and said this was acceptable. Only stated very 
mild pain suprapubically. No other complaints. 
 
Review of Systems
Constitutional:
Reports: no symptoms. 
 
Objective
Last 24 Hrs of Vital Signs/I&O
 Vital Signs
 
 
Date Time Temp Pulse Resp B/P B/P Pulse O2 O2 Flow FiO2
 
     Mean Ox Delivery Rate 
 
07/10 1357 98.0 82 18 118/80  96 Nasal 1.0L 
 
       Cannula  
 
07/10 0800       Nasal 3.0L 
 
       Cannula  
 
07/10 0659 98.3 77 20 110/74  92 Nasal 3.0L 
 
       Cannula  
 
07/10 0100 98.8 91 20 136/84  93 Nasal 3.0L 
 
       Cannula  
 
07/10 0000       Nasal 3.0L 
 
       Cannula  
 
07/09 2100 99.0 73 20 138/80  95 Nasal 2.5L 
 
       Cannula  
 
 
 Intake & Output
 
 
 07/10 1600 07/10 0800 07/10 0000
 
Intake Total  1000 500
 
Output Total 800 200 275
 
Balance -800 800 225
 
    
 
Intake, IV  1000 500
 
Output, Urine 800 200 275
 
Patient  197 lb 
 
Weight   
 
 
 
 
Physical Exam
General Appearance: Alert, Oriented X3, Cooperative, No Acute Distress
Skin: No Rashes, No Breakdown
Skin Temp/Moisture Exam: Warm/Dry
HEENT: Atraumatic, PERRLA, EOMI
Neck: Supple, No JVD, No thryomegaly, +2 Carotid Pulse wo Bruit
Cardiovascular: Regular Rate, Normal S1, Normal S2, No Murmurs
Lungs: Clear to Auscultation, Normal Air Movement
Abdomen: Normal Bowel Sounds, Soft, No Hepatospenomegaly, No Masses, mild 
tenderness in suprapubic area
Neurological: Normal Gait, Normal Speech, Strength at 5/5 X4 Ext, Normal Tone, 
Sensation Intact
Extremities: No Clubbing, No Cyanosis, No Edema
 
Assessment/Plan
Assessment:
57-year-old male with past medical history significant for hepatitis C, altered 
LFTs, anxiety/depression, active smoking, alcohol intoxication requiring 
multiple detoxes, pancreatitis who presented to [place] with acute onset 
difficulty in swallowing along with chest and abdominal pain.  Patient was 
recently incarcerated for 10 months following by staying inpatient 
rehabilitation for 3 months.  He was discharged yesterday morning, which point 
he drank 2-4 beers and took methadone overnight.  At that point he started to 
experience difficulty drinking liquids and solids.  CT angiogram ruled out 
pulmonary embolus.  CT abdomen with IV contrast did show fluid distention of the
stomach with abrupt transition point for gastric pylorus/duodenal bulb 
concerning for gastric outlet obstruction.  Hepatic steatosis noted. Overnight 
patient removed his NG tube, which surgery ok'ed post facto. Plan is to advance 
diet as tolerated and allow patient to leave tomorrow. 
 
Problem list/plan:
 
Alcohol induced gastrointestinal upset






 
Acute hypoxic hypercarbic respiratory failure

-TRC/nebs


 
Depression/anxiety

 
Hypothyroidism


 
BPH


 
Diabetes

 
DVT prophylaxis: Subcu Lovenox
Patient is full code
Problem List:
 1. Diminished gastric emptying
 
 2. Acute respiratory failure with hypoxia and hypercarbia
 
Pain Rating: 3
Pain Location:
suprapubic abdomen
Pain Goal: Remain pain free
Pain Plan:
Tylenol as needed, Zofran to control nausea
Tomorrow's Labs & Rationales:
none
 
Discharge Plan
Discharge Disposition: home
Stable for Discharge? No
Anticipated Discharge (Day): tomorrow
 
 
Oneyda,Shivakaren 07/10/18 1158:
Attending MD Review Statement
 
Attending Statement
Attending MD Statement: examined this patient, discuss w/resident/PA/NP, agreed 
w/resident/PA/NP, discussed with family, reviewed EMR data (avail), discussed 
with nursing, discussed with case mgmt, reviewed images, amended to note
Attending Assessment/Plan:
Patient seen/examined bedside. Patient NGT is removed with improvement in 
abdominal distension and obstruction.
 
General surgery consulted and recommend advance diet as tolerated, upper GI 
series negative.  Surgery suspects narcotics playing a role in his symptoms.
 
Alcohol intoxication on admission: BAC 88 on arrival. Monitor CIWA and ativan 
prn only. Иван monitor hemodynacmis. 
 
hypothyroidism continue home meds.
 
Discharge planning, as pateint continues to improve clinically and able to 
tolerate diet. Next 24-48 hrs. 
 
gi/dvt prophyalxis full code.

## 2018-07-10 NOTE — PATIENT DISCHARGE INSTRUCTIONS
Discharge Instructions
 
General Discharge Information
Special Instructions:
- Please follow up with your primary care physician within 1-2 weeks of 
discharge. Inform your primary care physician of this admission to [facility].
- Continue your current medications per discharge instructions.
- Please watch for these problems: Fever, Chills, Nausea, Vomiting, Shortness of
Breath, Productive Cough, Chest Pain/Discomfort, Abdominal Pain, Active Bleeding
or Bloody urine/stool.
 
Diet
Continue normal diet: Yes
 
Activity
Full Activity/No Limits: Yes
 
Acute Coronary Syndrome
 
Inclusion Criteria
At DC or during hospital stay patient has or had the following:
ACS DIAGNOSIS No
 
Discharge Core Measures
Meds if any: Prescribed or Continued at Discharge
Meds if any: NOT Prescribed or Continued at Discharge
 
Congestive Heart Failure
 
Inclusion Criteria
At DC or during hospital stay patient has or had the following:
CHF DIAGNOSIS No
 
Discharge Core Measures
Meds if any: Prescribed or Continued at Discharge
Meds if any: NOT Prescribed or Continued at Discharge
 
Cerebrovascular accident
 
Inclusion Criteria
At DC or during hospital stay patient has or had the following:
CVA/TIA Diagnosis No
 
Discharge Core Measures
Meds if any: Prescribed or Continued at Discharge
Meds if any: NOT Prescribed or Continued at Discharge
 
Venous thromboembolism
 
Inclusion Criteria
VTE Diagnosis No
VTE Type NONE
VTE Confirmed by (Test) NONE
 
Discharge Core Measures
- Per Current guidelines, there needs to be overlap
- treatment for the first 5 days of Warfarin therapy.
- If discharged on Warfarin prior to 5 days of
- overlap therapy, the patient will need to be
- assessed for post discharge needs including
- *Post discharge parental anticoagulation
- *Warfarin and/or parental anticoagulation education
- *Follow up date to check INR post discharge
At least 5 days overlap therapy as Inpatient No
Meds if any: Prescribed or Continued at Discharge
Note: Overlap Therapy is Warfarin and Anticoagulant
Meds if any: NOT Prescribed or Continued at Discharge

## 2018-07-10 NOTE — PN- GENERAL SURGERY
Surgical Brief Attending Note
Brief Attending Note:
upper gi reviewed. ng can be removed and diet started. call with questions.

## 2018-07-11 VITALS — SYSTOLIC BLOOD PRESSURE: 158 MMHG | DIASTOLIC BLOOD PRESSURE: 70 MMHG

## 2018-07-11 VITALS — DIASTOLIC BLOOD PRESSURE: 70 MMHG | SYSTOLIC BLOOD PRESSURE: 142 MMHG

## 2018-07-11 VITALS — SYSTOLIC BLOOD PRESSURE: 118 MMHG | DIASTOLIC BLOOD PRESSURE: 70 MMHG

## 2018-07-11 VITALS — SYSTOLIC BLOOD PRESSURE: 146 MMHG | DIASTOLIC BLOOD PRESSURE: 80 MMHG

## 2018-07-11 VITALS — DIASTOLIC BLOOD PRESSURE: 70 MMHG | SYSTOLIC BLOOD PRESSURE: 158 MMHG

## 2018-07-11 VITALS — SYSTOLIC BLOOD PRESSURE: 110 MMHG | DIASTOLIC BLOOD PRESSURE: 60 MMHG

## 2018-07-11 VITALS — DIASTOLIC BLOOD PRESSURE: 80 MMHG | SYSTOLIC BLOOD PRESSURE: 140 MMHG

## 2018-07-11 VITALS — DIASTOLIC BLOOD PRESSURE: 80 MMHG | SYSTOLIC BLOOD PRESSURE: 146 MMHG

## 2018-07-11 NOTE — DISCHARGE SUMMARY
Visit Information
 
Visit Dates
Admission Date:
07/08/18
 
Discharge Date:
07/12/18
 
 
Hospital Course
 
Course
Attending Physician:
Juan J Call MD
 
Primary Care Physician:
Art Barnes
 
Hospital Course:
57-year-old male with past medical history significant for hepatitis C, altered 
LFTs, anxiety/depression, active smoking, alcohol intoxication requiring 
multiple detoxes, pancreatitis who presented to [place] with acute onset 
difficulty in swallowing along with chest and abdominal pain.  Patient was 
recently incarcerated for 10 months following by staying inpatient 
rehabilitation for 3 months.  He was discharged yesterday morning, which point 
he drank 2-4 beers and took methadone overnight.  At that point he started to 
experience difficulty drinking liquids and solids.  CT angiogram ruled out 
pulmonary embolus.  CT abdomen with IV contrast did show fluid distention of the
stomach with abrupt transition point for gastric pylorus/duodenal bulb 
concerning for gastric outlet obstruction.  Hepatic steatosis noted. One day 
prior to discharge, patient removed his NG tube, which surgery ok'ed post 
facto.Patient's diet was advanced as tolerated, and  orthostatic vitals showed 
not hypotension. Patient was discharged home self care.
 
Vitals on admission:
Afebrile 97.2, RR 20, HR 95, /87, O2 92% on 3L
 
Labs on admission:
Lactic acid 2.6; urine screen positive for methadone; WBC 24.0 (leukocytotic)
Allergies:
Coded Allergies:
NO KNOWN ALLERGIES (NONE 02/06/18)
 
Pertinent Lab Results:
SERVICE DATE: 07/08/
EXAM TYPE: RAD - XRY-PORTABLE CHEST XRAY
 
IMPRESSION:
 
1. Lungs are hypoinflated.
2. No evidence of pneumonia or pleural effusion.
==========================================================================
SERVICE DATE: 07/08/
EXAM TYPE: CAT - CT ABD & PELVIS W IV CONTRAST; CTA CHEST-PULMONARY EMBOLISM
 
IMPRESSION:
1. No acute pulmonary findings. Specifically, no evidence of pulmonary
embolism.
2. Fluid distention of the stomach with an abrupt transition pointed the
gastric pylorus/duodenal bulb. This could be due to decreased gastric
motility or gastric outlet obstruction.
3. Reflux of fluid into the esophagus to the level of the thoracic inlet.
4. Prostatomegaly
5. Hepatic steatosis.
 
VTE: negative
==========================================================================
SERVICE DATE: 07/09/
EXAM TYPE: RAD - XRY-UPPER GI SERIES
 
IMPRESSION:
Upper gastrointestinal series performed with Gastroview contrast shows normal
appearance of the stomach and duodenum. No evidence of gastric outlet
obstruction.
 
 
Disposition Summary
 
Disposition
Principal Diagnosis:
Diminished gastric emptying
Additional Diagnosis:
Acute respiratory failure
Hypothyroidism
Depression/anxiety
Diabetes
BPH
Discharge Disposition: home or self care
 
Discharge Instructions
 
General Discharge Information
Code Status: Full Code
Patient's Diet:
Regular
Patient's Activity:
As tolerated
Follow-Up Instructions/Appts:
- Please follow up with your primary care physician within 1-2 weeks of 
discharge. Inform your primary care physician of this admission to .
- Continue your current medications per discharge instructions.
- Please watch for these problems: Fever, Chills, Nausea, Vomiting, Shortness of
Breath, Productive Cough, Chest Pain/Discomfort, Abdominal Pain, Active Bleeding
or Bloody urine/stool.
 
Medications at Discharge
Discharge Medications:
Continue taking these medications:
Aripiprazole (Abilify) 10 MG TABLET
    1 Tablet ORAL TWICE DAILY
    Comments:
       NOT GIVEN
 
Amitriptyline HCl (Amitriptyline HCl) 50 MG TABLET
    1 Tablet ORAL Every night
    Qty = 30
    Comments:
       NOT GIVEN
 
Tamsulosin HCl (Tamsulosin HCl) 0.4 MG CAP.ER.24H
    1 Capsule ORAL DAILY
    Qty = 30
    Comments:
       NOT GIVEN
 
Levothyroxine Sodium (Synthroid) (Unknown Strength) TABLET
    Unknown Dose ORAL DAILY
    Comments:
       Last Taken:7/12/18
             Time:6AM
 
Hydroxyzine HCl (hydrOXYzine HCl) 10 MG TABLET
    1 Tablet ORAL  as needed for ANXIETY
    Qty = 90
    Comments:
       NOT GIVEN
 
Gabapentin (Gabapentin) 300 MG CAPSULE
    1 Capsule ORAL THREE TIMES DAILY as needed for DEPRESSION/ANXIETY
    Comments:
       NOT GIVEN
 
 
Copies To:
Art Barnes

## 2018-07-11 NOTE — PN- HOUSESTAFF
Jono Lyn 18 0653:
Subjective
Follow-up For:
Presumed gastric outlet obstruction
Complaints: lightheaded
Subjective:
Patient was seen and examined at the bedside.  Patient appeared mildly 
uncomfortable, and minimally responsive but not lethargic. patient stated he was
sleepy. Patient complained of minimal suprapubic pain.  When asked about 
discharge, patient stated he did not feel steady enough to be discharged at this
time.
 
Review of Systems
Constitutional:
Reports: no symptoms. 
Gastrointestinal:
Reports: abdominal pain (mild suprapubic). 
 
Objective
Last 24 Hrs of Vital Signs/I&O
 Vital Signs
 
 
Date Time Temp Pulse Resp B/P B/P Pulse O2 O2 Flow FiO2
 
     Mean Ox Delivery Rate 
 
 1204      94 Room Air  
 
 1200 98.0 87 20 118/70     
 
 0800       Room Air  
 
 0800 97.8 67 18 142/70     
 
 0617 98.5 64 20 146/80  92 Nasal 1.0L 
 
       Cannula  
 
 0613 98.5 64 20 146/80     
 
 0200 98.5 65 20 140/80     
 
 0000      92 Nasal 1.5L 
 
       Cannula  
 
07/10 2246 98.5 65 20 130/80  92   
 
07/10 2200 98.5 65 20 130/80     
 
07/10 1936      94 Nasal 1.0L 
 
       Cannula  
 
07/10 1600       Nasal 2.0L 
 
       Cannula  
 
 
 Intake & Output
 
 
  1600  0800  0000
 
Intake Total  1000 1355
 
Output Total   
 
Balance  1000 1355
 
    
 
Intake, IV  1000 875
 
Intake, Oral   480
 
Number   1
 
Bowel   
 
Movements   
 
Patient 197 lb  
 
Weight   
 
 
 
 
Physical Exam
General Appearance: Alert, Oriented X3, No Acute Distress, minimally cooperative
, stated he was sleepy
Skin: No Rashes, No Breakdown
Skin Temp/Moisture Exam: Warm/Dry
HEENT: Atraumatic, PERRLA, EOMI
Neck: Supple, No JVD, No thryomegaly
Cardiovascular: Regular Rate, Normal S1, Normal S2, No Murmurs
Lungs: Clear to Auscultation, Normal Air Movement
Abdomen: Soft, No Tenderness, No Hepatospenomegaly
Neurological: Normal Gait, Normal Speech, Strength at 5/5 X4 Ext, Normal Tone
Extremities: No Clubbing, No Cyanosis, No Edema
 
Assessment/Plan
Assessment:
57-year-old male with past medical history significant for hepatitis C, altered 
LFTs, anxiety/depression, active smoking, alcohol intoxication requiring 
multiple detoxes, pancreatitis who presented to [place] with acute onset 
difficulty in swallowing along with chest and abdominal pain.  Patient was 
recently incarcerated for 10 months following by staying inpatient 
rehabilitation for 3 months.  He was discharged yesterday morning, which point 
he drank 2-4 beers and took methadone overnight.  At that point he started to 
experience difficulty drinking liquids and solids.  CT angiogram ruled out 
pulmonary embolus.  CT abdomen with IV contrast did show fluid distention of the
stomach with abrupt transition point for gastric pylorus/duodenal bulb 
concerning for gastric outlet obstruction.  Hepatic steatosis noted. Overnight 
patient removed his NG tube, which surgery ok'ed post facto. Plan is to advance 
diet as tolerated and allow patient to leave tomorrow. 
 
Problem list/plan:
 
Alcohol/Narcotic induced gastrointestinal upset




impending alcohol withdrawal reason for continued hospitalization

process
 
Acute hypoxic hypercarbic respiratory failure

-TRC/nebs as needed


 
Depression/anxiety

 
Hypothyroidism


 
BPH


 
Diabetes

 
DVT prophylaxis: Subcu Lovenox
Patient is full code
Problem List:
 1. Diminished gastric emptying
 
 2. ALCOHOL WITHDRAWAL
 
Pain Ratin
Pain Location:
suprapubic
Pain Goal: Remain pain free
Pain Plan:
conservative management
Tomorrow's Labs & Rationales:
KIM MariscalJuan J ann 18 1203:
Attending MD Review Statement
 
Attending Statement
Attending MD Statement: examined this patient, discuss w/resident/PA/NP, agreed 
w/resident/PA/NP, discussed with family, reviewed EMR data (avail), discussed 
with nursing, discussed with case mgmt, reviewed images, amended to note
Attending Assessment/Plan:
Patient feels light headed this morning. Denies hallucinations. He has feeling 
of his impending alcohol withdrawal. He was intoxicatd on admission with BAC 88.
Thereafter he is tolerating his diet as his condition improved with negative 
upper GI series and NGT removal. Will watch him on CIWA monitoring and ativan as
per CIWA. Thiamine, folic acid. Monitor electrolytes. Monitor hemodynamcis. Gi/
dvt prophyalxis Full code.

## 2018-07-12 VITALS — SYSTOLIC BLOOD PRESSURE: 138 MMHG | DIASTOLIC BLOOD PRESSURE: 82 MMHG

## 2018-07-12 NOTE — PN- HOUSESTAFF
Jono Lyn 07/12/18 0650:
Subjective
Follow-up For:
Abdominal pain
Complaints: light headedness
Subjective:
Patient was seen and examined at the bedside. No acute events overnight. Scored 
0 consistently overnight. Complains this morning only of remaining lightheadness
on standing and walking to the toilet. Still endorses mild pain in suprapubic 
abdomen. 
 
Review of Systems
Constitutional:
Reports: no symptoms. 
Gastrointestinal:
Reports: abdominal pain (suprapubic). 
Neurological/Psychological:
Reports: see HPI. 
 
Objective
Last 24 Hrs of Vital Signs/I&O
 Vital Signs
 
 
Date Time Temp Pulse Resp B/P B/P Pulse O2 O2 Flow FiO2
 
     Mean Ox Delivery Rate 
 
07/12 0642 98.3 71 20 138/82  91 Room Air  
 
07/11 2157 98.6 80 20 142/70  92   
 
07/11 1800 99.3 83 16 158/70     
 
07/11 1600 99.3 83 16 158/70     
 
07/11 1520 99.3 83 16 158/70  95 Room Air  
 
07/11 1204      94 Room Air  
 
07/11 1200 98.0 87 20 118/70     
 
 
 Intake & Output
 
 
 07/12 1600 07/12 0800 07/12 0000
 
Intake Total  200 200
 
Output Total   
 
Balance  200 200
 
    
 
Intake, Oral  200 200
 
 
 
 
Physical Exam
General Appearance: Alert, Oriented X3, Cooperative, No Acute Distress
Skin: No Rashes, No Breakdown
Skin Temp/Moisture Exam: Warm/Dry
HEENT: Atraumatic, PERRLA, EOMI
Neck: Supple, No JVD, No thryomegaly, +2 Carotid Pulse wo Bruit
Cardiovascular: Regular Rate, Normal S1, Normal S2, No Murmurs
Lungs: Clear to Auscultation, Normal Air Movement
Abdomen: Soft, No Tenderness, No Hepatospenomegaly
Neurological: borderline positive romberg; presumed to be due to orthostasis
Extremities: No Clubbing, No Cyanosis, No Edema
 
Assessment/Plan
Assessment:
57-year-old male with past medical history significant for hepatitis C, altered 
LFTs, anxiety/depression, active smoking, alcohol intoxication requiring 
multiple detoxes, pancreatitis who presented to [place] with acute onset 
difficulty in swallowing along with chest and abdominal pain.  Patient was 
recently incarcerated for 10 months following by staying inpatient 
rehabilitation for 3 months.  He was discharged yesterday morning, which point 
he drank 2-4 beers and took methadone overnight.  At that point he started to 
experience difficulty drinking liquids and solids.  CT angiogram ruled out 
pulmonary embolus.  CT abdomen with IV contrast did show fluid distention of the
stomach with abrupt transition point for gastric pylorus/duodenal bulb 
concerning for gastric outlet obstruction.  Hepatic steatosis noted. Overnight 
patient removed his NG tube, which surgery ok'ed post facto. Plan is to advance 
diet as tolerated and allow patient to leave today after checking orthostatic 
vital signs. 
 
Problem list/plan:
 
Alcohol/Narcotic induced gastrointestinal upset




impending alcohol withdrawal reason for continued hospitalization

process
 
Acute hypoxic hypercarbic respiratory failure

-TRC/nebs as needed


 
Depression/anxiety

 
Hypothyroidism


 
BPH


 
Diabetes

 
DVT prophylaxis: Subcu Lovenox
Patient is full code
Problem List:
 1. Diminished gastric emptying
 
 2. Acute respiratory failure with hypoxia and hypercarbia
 
Pain Rating: 3
Pain Location:
suprapubic abdomen
Pain Goal: Remain pain free
Pain Plan:
none in place
Tomorrow's Labs & Rationales:
none, discharged
 
 
Juan J Call 07/12/18 1043:
Attending MD Review Statement
 
Attending Statement
Attending MD Statement: examined this patient, discuss w/resident/PA/NP, agreed 
w/resident/PA/NP, discussed with family, reviewed EMR data (avail), discussed 
with nursing, discussed with case mgmt, reviewed images, amended to note
Attending Assessment/Plan:
Patient feels no new complaints. Orthostats f/u. His CIWA monitoring score is 
zero. Thiamine, folic acid. SW appreciated. Patient is medically stable for 
discharge if orthostats negative. Anticipate discharge soon.

## 2018-08-09 ENCOUNTER — HOSPITAL ENCOUNTER (INPATIENT)
Dept: HOSPITAL 68 - ERH | Age: 58
LOS: 6 days | DRG: 754 | End: 2018-08-15
Attending: PSYCHIATRY & NEUROLOGY | Admitting: PSYCHIATRY & NEUROLOGY
Payer: COMMERCIAL

## 2018-08-09 VITALS — DIASTOLIC BLOOD PRESSURE: 77 MMHG | SYSTOLIC BLOOD PRESSURE: 127 MMHG

## 2018-08-09 VITALS — DIASTOLIC BLOOD PRESSURE: 74 MMHG | SYSTOLIC BLOOD PRESSURE: 123 MMHG

## 2018-08-09 VITALS — DIASTOLIC BLOOD PRESSURE: 71 MMHG | SYSTOLIC BLOOD PRESSURE: 116 MMHG

## 2018-08-09 VITALS — BODY MASS INDEX: 23.69 KG/M2 | HEIGHT: 70 IN | WEIGHT: 165.5 LBS

## 2018-08-09 VITALS — SYSTOLIC BLOOD PRESSURE: 119 MMHG | DIASTOLIC BLOOD PRESSURE: 77 MMHG

## 2018-08-09 VITALS — DIASTOLIC BLOOD PRESSURE: 70 MMHG | SYSTOLIC BLOOD PRESSURE: 110 MMHG

## 2018-08-09 VITALS — DIASTOLIC BLOOD PRESSURE: 98 MMHG | SYSTOLIC BLOOD PRESSURE: 132 MMHG

## 2018-08-09 VITALS — SYSTOLIC BLOOD PRESSURE: 122 MMHG | DIASTOLIC BLOOD PRESSURE: 77 MMHG

## 2018-08-09 DIAGNOSIS — Z72.89: ICD-10-CM

## 2018-08-09 DIAGNOSIS — F32.9: Primary | ICD-10-CM

## 2018-08-09 DIAGNOSIS — G89.29: ICD-10-CM

## 2018-08-09 DIAGNOSIS — K86.1: ICD-10-CM

## 2018-08-09 DIAGNOSIS — M54.5: ICD-10-CM

## 2018-08-09 DIAGNOSIS — B19.20: ICD-10-CM

## 2018-08-09 DIAGNOSIS — E03.9: ICD-10-CM

## 2018-08-09 LAB
ABSOLUTE GRANULOCYTE CT: 2.2 /CUMM (ref 1.4–6.5)
BASOPHILS # BLD: 0 /CUMM (ref 0–0.2)
BASOPHILS NFR BLD: 0.8 % (ref 0–2)
EOSINOPHIL # BLD: 0 /CUMM (ref 0–0.7)
EOSINOPHIL NFR BLD: 0.8 % (ref 0–5)
ERYTHROCYTE [DISTWIDTH] IN BLOOD BY AUTOMATED COUNT: 15.2 % (ref 11.5–14.5)
GRANULOCYTES NFR BLD: 40.4 % (ref 42.2–75.2)
HCT VFR BLD CALC: 47.4 % (ref 42–52)
LYMPHOCYTES # BLD: 2.6 /CUMM (ref 1.2–3.4)
MCH RBC QN AUTO: 34.3 PG (ref 27–31)
MCHC RBC AUTO-ENTMCNC: 34.7 G/DL (ref 33–37)
MCV RBC AUTO: 99 FL (ref 80–94)
MONOCYTES # BLD: 0.6 /CUMM (ref 0.1–0.6)
PLATELET # BLD: 202 /CUMM (ref 130–400)
PMV BLD AUTO: 8.1 FL (ref 7.4–10.4)
RED BLOOD CELL CT: 4.79 /CUMM (ref 4.7–6.1)
WBC # BLD AUTO: 5.5 /CUMM (ref 4.8–10.8)

## 2018-08-09 PROCEDURE — G0480 DRUG TEST DEF 1-7 CLASSES: HCPCS

## 2018-08-09 NOTE — ED PSYCH CRISIS CONSULTATION
**See Addendum**
Crisis Consult
 
Basic Assessment
Date of Consult: 08/09/18
Responsible Person/Accompanied By: SELF
Insurance Authorization:
Insurance #1:
 
Insurance name: JUAN A GAONA
Phone number: 
Policy number: [id number]
Group number: 
Authorization number: 
 
 
ED Provider:
Patient's ED Provider: Bacilio Durant
 
Primary Care Physician:
Patient's PCP: Art Barnes
PCP's Phone Number: [phone]
 
Current Psychiatrist: [facility]
Chief Complaint: Psychiatric Related Complaint
Patient's Quote: "I just need a little rest"
Present Illness:
Pt. was a 57 year old  male with a very red face and bloodshot eyes, 
interviewed in the ER.  He reported his timeline included [facility]  psychiatric 
admission earlier this year, followed by 4 days of Crisis and Respite, followed 
by [facility] (typically 28-45 days), followed by 90 days at [facility].  He 
was discharged to his brother's home in [place], [place] and he attends [facility].  He reported that he "ruined his meds in the rain" but that they were 
prescribed by [facility] and he never followed up with medications prescribed by 
[facility].  He reported he decided to come to the ER after he was up all 
night last night.  He reported having 4-5 beers before coming here this morning 
and he typically drinks a 12 pack or more per day and has done this since being 
discharged from [facility] 4 1/2 weeks ago.  He stated that his thoughts "get
crazy", meaning that he feels suicidal but he does not have a plan (contrary to 
what he stated this morning), and he does feel like he "has katie" and wants to 
live overall.  He denied any past suicide attempts but stated that 2 years ago, 
after 8-10 years sober from Heroin, he used 2 bags of heroin and had an 
accidental overdose.  He reported many past psych hospitalizations at [facility].  He reported nightmares and flashbacks from taking care of family 
members (mother and brother) who were ill.  He stated his mother was bedridden 
for 2 years and he cared for her. 
 
Client also is on probation,  is out of [place].  He says "I
need to contact him", denied any upcoming court dates. After speaking with 
brother, pt. may be homeless and staying in shelters as brother denied that he 
was staying with him long term.
Patient's Address:
[address] [place] [id number]
Home Phone Number: [phone]
Other Phone Number: 
 
Who Do You Live With? Other (see notes) (brother)
Family/Informants Interviewed: interviewed brother, who pt. reported that he 
stayed with.  Brother, Suraj [phone], reporte that he lets his brother 
stay with him "for a day or two" but he has his own kids and he can't let him 
stay for long periods of time.  He said he assumed his brother was staying in 
the shelter.  He did report he is concerned about his brother.  He said he was 
"in the program" and doing well but as soon as he was released "it all started 
all over again" and he began drinking.  He reported that a few days ago pt. told
him he was walking along the train tracks and he thought about waiting for a 
train to come to jump in front of it and end his life.  Suraj reported that 
pt. "needs help".
Allergies -
Coded Allergies:
NO KNOWN ALLERGIES (NONE 02/06/18)
 
Current Medications -
Scheduled Medications
Amitriptyline HCl 50 MG TABLET   1 TAB PO QPM MENTAL HEALTH/SLEEP #30  (Reported
)
     Entered as Reported by Luisana Mathias on 07/08/18 1236
Aripiprazole (Abilify) 10 MG TABLET   1 TAB PO BID MENTAL HEALTH  (Reported)
     Entered as Reported by Luisana Mathias on 07/08/18 1235
Levothyroxine Sodium (Synthroid) (Unknown Strength) TABLET   (Unknown Dose) PO 
DAILY SUPPLEMENT  (Reported)
     Entered as Reported by Luisana Mathias on 07/08/18 1237
Tamsulosin HCl 0.4 MG CAP.ER.24H   1 CAP PO DAILY  #30  (Reported)
     Entered as Reported by Luisana Mathias on 07/08/18 1237
 
Scheduled PRN Medications
Gabapentin 300 MG CAPSULE   1 CAP PO TID PRN DEPRESSION/ANXIETY  (Reported)
     Entered as Reported by Sharon Cruz MD on 07/08/18 1649
Hydroxyzine HCl (hydrOXYzine HCl) 10 MG TABLET   1 TAB PO PRN ANXIETY #90  (
Reported)
     Entered as Reported by Luisana Mathias on 07/08/18 1238
 
Laboratory Results:
 Laboratory Tests
 
08/09/18 1129:
Urine Opiates Screen < 100, Methadone Screen < 40, Barbiturate Screen < 60, Ur 
Phencyclidine Scrn < 6.00, Amphetamines Screen < 100, U Benzodiazepines Scrn < 
85, Urine Cocaine Screen < 50, Urine Cannabis Screen 7.40
 
08/09/18 0949:
Anion Gap 10, Estimated GFR > 60, BUN/Creatinine Ratio 8.3, Glucose 94, Calcium 
9.1, Total Bilirubin 0.6,   H,   H, Alkaline Phosphatase 75, Total
Protein 7.8, Albumin 4.3, Globulin 3.5, Albumin/Globulin Ratio 1.2, CBC w Diff 
NO MAN DIFF REQ, RBC 4.79, MCV 99.0  H, MCH 34.3  H, MCHC 34.7, RDW 15.2  H, MPV
8.1, Gran % 40.4  L, Lymphocytes % 47.5, Monocytes % 10.5  H, Eosinophils % 0.8,
Basophils % 0.8, Absolute Granulocytes 2.2, Absolute Lymphocytes 2.6, Absolute 
Monocytes 0.6, Absolute Eosinophils 0, Absolute Basophils 0, Serum Alcohol 298.0
 
 
Past History
 
Past Medical History
Neurological: NONE
EENT: NONE
Cardiovascular: NONE
Respiratory: NONE
Gastrointestinal: pancreatitis
Hepatic: hepatitis C, ELEVATED LIVER ENZYMES
Renal: NONE
Musculoskeletal: NONE
Psychiatric: anxiety, depression, insomnia, STRESS
Endocrine: NONE
Blood Disorders: NONE
Cancer(s): NONE
GYN/Reproductive: NONE
 
Past Surgical History
Surgical History: non-contributory
 
Psychosocial History
Strengths/Capabilities:
Pt reports he is motivated for treatment and willing to follow through with
 outpatient services. The pt also has support of his brother.
Physical Limitations (Interventions):
None known
 
Psychiatric Treatment History
Psych Treatment
   Psychiatric Treatment Yes
   Inpatient Treatment Yes
   Outpatient Treatment Yes
   Location of Treatment [facility]
   Reason for Treatment
depression and substance abuse
   Dates of Treatment various
Diagnosis by History:
Depression
Alcohol use d/o
Opioid use d/o
 
Substance Use/Abuse History
Drug Use/Abuse
   Substances Used/Abused Yes
   Substance Used/Abused Alcohol
   First Use unknown
   Last Used this morning
   How much used/taken 4-5 beers but typically 12 beers per day
   How often daily
   For how long 1 month
 
Substance Abuse Treatment
Substance Abuse Treatment
   Past Substance Abuse [place] Yes
   Inpatient Treatment Yes
   Outpatient Treatment Yes
   Location of Treatment [facility]
   Reason for Treatment
ongoing substance abuse
   Response to Treatment
relapses
 
Current Mental Status
 
Mental Status
Orientation: Person, Place, Situation
Affect: Depressed, Flat, Sad
Speech: Soft
Neuro-vegetative: Concentration Poor, Energy Decreased, Helpless, Sleep 
Disturbance
 
Appearance
Appearance- Dress/Hygiene:
very red face and eyes, depressed looking, in hospital gown.
 
Behaviors
Thought Process: WNL
Thought Content: WNL
Memory: WNL
Insight: Poor
 
SI/HI Risk Assessment
Past Suicidal Ideation/Attempts Yes
Current Suicidal Ideation/Att Yes
Past Homicidal Ideation/Att: No
Current Homicidal Ideation/Attempts No
Degree of Intent: Thoughts/No Intent
Danger To: Self
Gravely Disabled: Inability, Lack of Insight, Poor Impulse Control, Poor 
Judgment
Risk Factors: high anxiety/distress, SA/MH hospitalized, substance abuse, male, 
limited support
Lethality Rating: 3
 
PTSD Checklist
PTSD Score:
 
 
PTSD Score: Response Value
 
Disturbing memories,thoughts,images of stressful experience? Moderately 3
 
Disturbing dreams of stressful experience from past? Moderately 3
 
Suddenly acting/feeling as if reliving stressful experience? Not at all 1
 
Unpleasant feeling when reminded of stressful experience? A little bit 2
 
Physical reactions when reminded of stressful experience? Not at all 1
 
Avoid thinking/talking of stressful exp. to avoid reactions? Not at all 1
 
Avoid activities/situations that remind of stressful exp.? Not at all 1
 
Trouble remembering important parts of stressful experience? Not at all 1
 
Loss of interest in things that you used to enjoy? Extremely 5
 
Feeling distant or cut off from other people? Moderately 3
 
Feeling emotionally numb/unable to love those close to you? A little bit 2
 
Feeling as if your future will somehow be cut short? Not at all 1
 
Trouble falling or staying asleep? Extremely 5
 
Feeling irritable or having angry outbursts? Moderately 3
 
Having difficulty concentrating? Moderately 3
 
Being super alert or watchful on guard? Not at all 1
 
Feeling jumpy or easily startled? Not at all 1
 
Total   37
 
 
 
ED Management
Sitter: Yes
Restraints: No
 
DSM5/PS Stressors/Medical Prob
Diagnosis' (DSM 5, Stressors, Medical):
F32.2 Major Depressive Disorder
F10.20 Alcohol Use Disorder
Current GAF: 25
 
Departure
 
Disposition
Psych Medical Clearance
   Date: 08/09/18
Date Disposition Established: 08/09/18
Time Disposition Established: 2050
Plan for Disposition -
   Modality: Hold over for Re-eval
   Facility: [facility]
Rationale for Disposition:
Dr. Serrano consulted.  She agrees with the plan to re-evaluate pt. tomorrow.  He 
shows risk factors including SI and statements about SI but only when 
intoxicated.  Dr. Serrano instructed that pt. should continue to be monitored for 
CIWA scores and withdrawal.  Pt. should be re-evaled by crisis in the morning to
evaluate for continued suicidality.
 
CSSRS was administered and pt. showed risk factors:  lifetime self injury (
through drinking and drug use), suicidal thoughts, suicidal thorughts with 
method but without intent, suicidal intent without specific plan, recent loss, 
pending isolation, prev. psych diag., non complaint with tx., hopelessness, MDD,
substance abuse/dependence, mehtod for suicide available.  Protective factors 
incliuded:  Identifies reasons for living "katie", supportive family.
Referrals
Art Barnes (PCP/Family)

## 2018-08-09 NOTE — ED PSYCHIATRIC COMPLAINT
History of Present Illness
 
General
Chief Complaint: Psychiatric Related Complaint
Stated Complaint: BIBA ETOH, +SI
Source: patient
Exam Limitations: no limitations
 
Vital Signs & Intake/Output
Vital Signs & Intake/Output
 Vital Signs
 
 
Date Time Temp Pulse Resp B/P B/P Pulse O2 O2 Flow FiO2
 
     Mean Ox Delivery Rate 
 
08/13 1201  81  126/93     
 
08/13 0807 97.1 82  116/85     
 
08/13 0803 97.6 74  116/85     
 
08/12 1957 96.7 93  127/86     
 
08/12 1954 96.7 93  127/86     
 
08/12 1651  74  134/82     
 
 
 
Allergies
Coded Allergies:
NO KNOWN ALLERGIES (NONE 02/06/18)
 
Reconcile Medications
Amitriptyline HCl 50 MG TABLET   1 TAB PO QPM MENTAL HEALTH/SLEEP  (Reported)
Aripiprazole (Abilify) 10 MG TABLET   1 TAB PO BID MENTAL HEALTH  (Reported)
Gabapentin 300 MG CAPSULE   1 CAP PO TID PRN DEPRESSION/ANXIETY  (Reported)
Hydroxyzine HCl (hydrOXYzine HCl) 10 MG TABLET   1 TAB PO PRN ANXIETY  (Reported
)
Levothyroxine Sodium (Synthroid) (Unknown Strength) TABLET   50 MCG PO DAILY 
HYPOTHYROID  (Reported)
Tamsulosin HCl 0.4 MG CAP.ER.24H   1 CAP PO DAILY   (Reported)
 
Triage Note:
BROUGHT IN BY [facility]. STATES HAS BEEN DRINKING.
 +SI WITH PLAN TO HANG SELF WITH BELT OR JUMP IN
 FRONT OF TRAIN. ON PEER.
Triage Nurses Notes Reviewed? yes
Onset: Abrupt
HPI:
58yo intoxicated male with PMH of depression, hepatitis C, pancreatitis c/o 
suicidal ideation and plan. Pt was incarcerated for domestic violence and is 
currently on probation. Pt was d/c from rehab about 4 weeks ago. Pt relapsed and
has been drinking beer. Admits to smoking cigars, cigarettes, and marijuana. Pt 
was being treated for his depression with several medications including Abilify 
and gabapentin, but stopped taking them because he "is depressed and wants to 
die". Pt has belt and plan to commit suicide. This morning, pt called hotline 
and was brought into the hospital. Pt continues to say that he has "nothing to 
live for", "wants to die", and "is depressed." Pt admits to having an alcohol 
problem. Pt lives on his own. Pt does not have any social support and has not 
talked to his only son in 6 years. Pt currently denies any fevers, chills, 
nausea, vomiting, chest pain, abdominal pain, numbness/tingling.
(Bacilio Durant)
 
Past History
 
Travel History
Traveled to [place] past 21 day No
 
Medical History
Neurological: NONE
EENT: NONE
Cardiovascular: NONE
Respiratory: NONE
Gastrointestinal: pancreatitis
Hepatic: hepatitis C, ELEVATED LIVER ENZYMES
Renal: NONE
Musculoskeletal: NONE
Psychiatric: anxiety, depression, insomnia, STRESS
Endocrine: NONE
Blood Disorders: NONE
Cancer(s): NONE
GYN/Reproductive: NONE
History of MRSA: No
History of VRE: No
History of CDIFF: No
Tetanus Vaccine: 08/05/17
 
Surgical History
Surgical History: non-contributory
 
Psychosocial History
Who do you live with Other (see notes)
Services at Home None
What is your primary language English
Tobacco Use: Current Daily Use
Daily Tobacco Use Amount/Type: => 5 Cigarettes daily
ETOH Use: alcoholic
Illicit Drug Use: denies illicit drug use
 
Family History
Family History, If Any:
MOTHER
  FH: diabetes mellitus
  FHx: stroke
FATHER
  FHx: stroke
BROTHER
  FH: heart attack
 
(Bacilio Durant)
 
Medical History
Any Pertinent Medical History? see below for history
 
Family History
Hx Contributory? No
(Binu Villalta MD)
 
Review of Systems
 
Review of Systems
Constitutional:
Reports: see HPI. 
EENTM:
Reports: see HPI. 
Respiratory:
Reports: see HPI. 
Cardiovascular:
Reports: see HPI. 
GI:
Reports: see HPI. 
Genitourinary:
Reports: see HPI. 
Musculoskeletal:
Reports: see HPI. 
Skin:
Reports: see HPI. 
Neurological/Psychological:
Reports: see HPI. 
Hematologic/Endocrine:
Reports: see HPI. 
Immunologic/Allergic:
Reports: see HPI. 
All Other Systems: Reviewed and Negative
(Bacilio Durant)
 
Physical Exam
 
Physical Exam
General Appearance: anxious, lethargic, intoxicated
Head: atraumatic, normal appearance
Eyes:
Bilateral: EOMI, other (erythematous conjunctiva ). 
Ears, Nose, Throat: normal pharynx, normal ENT inspection, hearing grossly 
normal
Neck: normal inspection, supple, full range of motion
Respiratory: chest non-tender, no respiratory distress, lungs clear, wheezing (
upper lobes)
Cardiovascular: regular rate/rhythm
Gastrointestinal: normal bowel sounds, soft, non-tender
Extremities: normal range of motion
Neurological/Psychiatric: agitated, anxious, depressed affect, depressed, +SI
Appearance/Memory/Insight: disheveled, impaired insight
Behavoir/Eye Contact/Speech: cooperative, normal speech, good eye contact
Thoughts/Hallucinations: normal thought pattern, paranoid
Skin: intact, normal color, warm/dry
SAD PERSONS
 
 
SAD PERSONS Response Value
 
Male Sex? yes 1
 
Age <19 or >45 years? yes 1
 
Depression/Hopelessness? yes 2
 
Excessive Ethanol/Drug Use? yes 1
 
Rational Thinking Loss? yes 2
 
Single//? yes 1
 
Social Support? has no support 1
 
Stated Future Intent? yes 2
 
Total   11
 
 
(Bacilio Durant)
SAD PERSONS Done? yes
(Binu Villalta MD)
 
Progress
Differential Diagnosis: dementia, drug intoxication, drug overdose, drug 
withdrawal
Plan of Care:
 Current Medications
 
 
  Sig/Leyda Start time  Last
 
Medication Dose  Stop Time Status Admin
 
Lorazepam 0.5 MG ONCE ONE 08/13 1800 CAN 
 
(Ativan)   08/13 1801  
 
Gabapentin 600 MG Q4 HRS AS NEEDED PRN 08/10 1230 AC 08/13
 
(Neurontin)     1212
 
Al Hydroxide/Mg  30 ML Q4-6 PRN PRN 08/10 1015 AC 
 
Hydroxide     
 
(Maalox Plus)     
 
Albuterol Sulfate 2 PUF Q6P PRN 08/10 1015 AC 
 
(Ventolin)     
 
Ibuprofen 400 MG Q6P PRN 08/10 1015 AC 
 
(Motrin)     
 
Lorazepam 2 MG Q2P PRN 08/10 1015 AC 
 
(Ativan)     
 
Lorazepam 1 MG Q2P PRN 08/10 1015 AC 08/11
 
(Ativan)     1959
 
Magnesium Hydroxide 30 ML AT BEDTIME AS NEED.. 08/10 1015 AC 
 
(Milk Of Magnesia)     
 
Nicotine 2 MG Q2P PRN 08/10 1015 AC 
 
(Nicotine)     
 
Aripiprazole 5 MG DAILY 08/10 1010 AC 08/13
 
(Abilify)     0805
 
Levothyroxine Sodium 0.05 MG DAILY AC 08/10 1010 AC 08/13
 
(Synthroid)     0625
 
 
 
Hand-Off
   Endorsed To:
Faisal Ma MD
(Bacilio Durant)
Hand-Off
   Endorsed To:
Christopher Cordero MD
   Endorsed Time: 0700
   Pending: consult
(Binu Villalta MD)
Initial ED EKG: NSR, nonspecific ST T wave chg
Prior EKG: unchanged
(Christopher Cordero MD)
 
Departure
 
Departure
Condition: Stable
Referrals:
Art Barnes (PCP/Family)
 
Departure Forms:
Customer Survey
General Discharge Information
(Bacilio Durant)
 
Departure
Disposition: STILL A PATIENT
Clinical Impression
Primary Impression: Depression with suicidal ideation
Secondary Impressions: Alcohol intoxication delirium
 
PA/NP Co-Sign Statement
Statement:
ED Attending supervision documentation-
 
x I saw and evaluated the patient. I have also reviewed all the pertinent lab 
results and diagnostic results. I agree with the findings and the plan of care 
as documented in the PA's/NP's documentation. 
 
[] I have reviewed the ED Record and agree with the PA's/NP's documentation.
 
[] Additions or exceptions (if any) to the PAs/NP's note and plan are 
summarized below:
[]
 
(Ambar CHAPMAN,Binu)
 
Psych Admission Note
Psychiatric Admission:
I have seen and evaluated FE PAGE.
 
I have also reviewed all the pertinent lab results and diagnostic results.
 
FE PAGE will be admitted to our inpatient Psychiatric unit for 
treatment and care.
 
 
PA/NP Co-Sign Statement
Statement:
ED Attending supervision documentation-
 
[X] I saw and evaluated the patient. I have also reviewed all the pertinent lab 
results and diagnostic results. I agree with the findings and the plan of care 
as documented in the PA's/NP's documentation. 
 
X] I have reviewed the ED Record and agree with the PA's/NP's documentation.
 
[] Additions or exceptions (if any) to the PAs/NP's note and plan are 
summarized below:
[PT TO BE ADMITTED TO [facility] FOR MED MANAGEMENT]
 
(Christopher Cordero MD)
 
X] I have reviewed the ED Record and agree with the PA's/NP's documentation.
 
[] Additions or exceptions (if any) to the PAs/NP's note and plan are 
summarized below:
[PT TO BE ADMITTED TO [facility] FOR MED MANAGEMENT]
 
(Christopher Cordero MD)
 
 
Lorazepam 1.5 MG Q12H 08/12 0600 AC 
 
(Ativan)   08/12 1801  
 
Lorazepam 1 MG Q12H 08/12 0000 AC 
 
(Ativan)   08/12 1201  
 
Lorazepam 1.5 MG Q6 08/11 0600 AC 
 
(Ativan)   08/11 1801  
 
Gabapentin 300 MG AT BEDTIME 08/10 2100 CAN 
 
(Neurontin)     
 
Gabapentin 600 MG Q4 HRS AS NEEDED PRN 08/10 1230 AC 
 
(Neurontin)     
 
Lorazepam 2 MG Q6 08/10 1200 AC 08/10
 
(Ativan)   08/11 0001  1220
 
Al Hydroxide/Mg  30 ML Q4-6 PRN PRN 08/10 1015 AC 
 
Hydroxide     
 
(Maalox Plus)     
 
Albuterol Sulfate 2 PUF Q6P PRN 08/10 1015 AC 
 
(Ventolin)     
 
Ibuprofen 400 MG Q6P PRN 08/10 1015 AC 
 
(Motrin)     
 
Lorazepam 2 MG Q2P PRN 08/10 1015 AC 
 
(Ativan)     
 
Lorazepam 1 MG Q2P PRN 08/10 1015 AC 
 
(Ativan)     
 
Magnesium Hydroxide 30 ML AT BEDTIME AS NEED.. 08/10 1015 AC 
 
(Milk Of Magnesia)     
 
Nicotine 2 MG Q2P PRN 08/10 1015 AC 
 
(Nicotine)     
 
Folic Acid 1 MG DAILY 08/10 1012 AC 08/10
 
(Folic Acid)   08/12 0901  1041
 
Multivitamins 1 TAB DAILY 08/10 1012 AC 08/10
 
(Theragran Vitamins)     1041
 
Thiamine HCl 100 MG DAILY 08/10 1012 AC 08/10
 
(Vitamin B1)   08/12 0901  1041
 
Aripiprazole 5 MG DAILY 08/10 1010 AC 08/10
 
(Abilify)     1041
 
Levothyroxine Sodium 0.05 MG DAILY AC 08/10 1010 AC 08/10
 
(Synthroid)     1041
 
 
 
Hand-Off
   Endorsed To:
Consuelo CHAPMAN,Christopher NORMAN
   Endorsed Time: 0700
   Pending: consult
(Binu Villalta MD)
Initial ED EKG: NSR, nonspecific ST T wave chg
Prior EKG: unchanged
(Christopher Cordero MD)
 
Departure
 
Departure
Condition: Stable
Referrals:
Art Barnes (PCP/Family)
 
Departure Forms:
Customer Survey
General Discharge Information
(Bacilio Durant)
 
Departure
Disposition: STILL A PATIENT
Clinical Impression
Primary Impression: Depression with suicidal ideation
Secondary Impressions: Alcohol intoxication delirium
(Binu Villalta MD)
 
Psych Admission Note
Psychiatric Admission:
I have seen and evaluated FE PAGE.
 
I have also reviewed all the pertinent lab results and diagnostic results.
 
FE PAGE will be admitted to our inpatient Psychiatric unit for 
treatment and care.
 
 
PA/NP Co-Sign Statement
Statement:
ED Attending supervision documentation-
 
[X] I saw and evaluated the patient. I have also reviewed all the pertinent lab 
results and diagnostic results. I agree with the findings and the plan of care 
as documented in the PA's/NP's documentation. 
 
X] I have reviewed the ED Record and agree with the PA's/NP's documentation.
 
[] Additions or exceptions (if any) to the PAs/NP's note and plan are 
summarized below:
[PT TO BE ADMITTED TO [facility] FOR MED MANAGEMENT]
 
(Consuelo CHAPMAN,Christopher NORMAN)
 
[PT TO BE ADMITTED TO [facility] FOR MED MANAGEMENT]
 
(Consuelo CHAPMAN,Christopher NORMAN)

## 2018-08-10 VITALS — DIASTOLIC BLOOD PRESSURE: 83 MMHG | SYSTOLIC BLOOD PRESSURE: 131 MMHG

## 2018-08-10 VITALS — DIASTOLIC BLOOD PRESSURE: 84 MMHG | SYSTOLIC BLOOD PRESSURE: 135 MMHG

## 2018-08-10 VITALS — DIASTOLIC BLOOD PRESSURE: 80 MMHG | SYSTOLIC BLOOD PRESSURE: 116 MMHG

## 2018-08-10 VITALS — DIASTOLIC BLOOD PRESSURE: 79 MMHG | SYSTOLIC BLOOD PRESSURE: 133 MMHG

## 2018-08-10 VITALS — SYSTOLIC BLOOD PRESSURE: 131 MMHG | DIASTOLIC BLOOD PRESSURE: 83 MMHG

## 2018-08-10 VITALS — SYSTOLIC BLOOD PRESSURE: 116 MMHG | DIASTOLIC BLOOD PRESSURE: 80 MMHG

## 2018-08-10 VITALS — DIASTOLIC BLOOD PRESSURE: 76 MMHG | SYSTOLIC BLOOD PRESSURE: 144 MMHG

## 2018-08-10 VITALS — DIASTOLIC BLOOD PRESSURE: 78 MMHG | SYSTOLIC BLOOD PRESSURE: 124 MMHG

## 2018-08-10 VITALS — DIASTOLIC BLOOD PRESSURE: 82 MMHG | SYSTOLIC BLOOD PRESSURE: 117 MMHG

## 2018-08-10 VITALS — DIASTOLIC BLOOD PRESSURE: 86 MMHG | SYSTOLIC BLOOD PRESSURE: 137 MMHG

## 2018-08-10 VITALS — DIASTOLIC BLOOD PRESSURE: 82 MMHG | SYSTOLIC BLOOD PRESSURE: 132 MMHG
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
## 2018-08-10 NOTE — CPS PROVIDER INIT ASMT PSYCH
Psychiatric Admission
Crisis Worker's Note Reviewed: Yes
Patient Seen and Examined: Yes
Identifying Information:
sadie crow Andrade LCSW notes of 8/10/18:
"Pt. is a 57 year old  male with a very red face and bloodshot eyes."
Chief Complaint:
crow Kumar LCSW notes of 8/10/18:
"I just need a little rest"
Reaction to Hospitalization:
The patient was admitted voluntarily
 
History of Present Illness
Onset of Illness:
at least since 
 
Circumstances Leading to Admission:
crow Kumar LCSW notes of 8/10/18:
"Pt. was a 57 year old  male with a very red face and bloodshot eyes, 
interviewed in the ER.  He reported his timeline included [facility] psychiatric 
admission earlier this year, followed by 4 days of Crisis and Respite, followed 
by [facility] (typically 28-45 days), followed by 90 days at [facility].  He 
was discharged to his brother's home in [place], [place] and he attends [facility].  He reported that he "ruined his meds in the rain" but that they were 
prescribed by [facility] and he never followed up with medications prescribed by 
[facility].  He reported he decided to come to the ER after he was up all 
night last night.  He reported having 4-5 beers before coming here this morning 
and he typically drinks a 12 pack or more per day and has done this since being 
discharged from [facility] 4 1/2 weeks ago.  He stated that his thoughts "get
crazy", meaning that he feels suicidal but he does not have a plan (contrary to 
what he stated this morning), and he does feel like he "has katie" and wants to 
live overall.  He denied any past suicide attempts but stated that 2 years ago, 
after 8-10 years sober from Heroin, he used 2 bags of heroin and had an 
accidental overdose.  He reported many past psych hospitalizations at [facility].  He reported nightmares and flashbacks from taking care of family 
members (mother and brother) who were ill.  He stated his mother was bedridden 
for 2 years and he cared for her. 
Problem(s) Justifying Need for Admission:
crow Kumar LCSW notes of 8/10/18:
"He stated that his thoughts "get crazy", meaning that he feels suicidal but he 
does not have a plan (contrary to what he stated this morning), and he does feel
like he "has katie" and wants to live overall."
 
 
Past Psychiatric History
Past Diagnosis(es)- if any:
per Nai Falcon LCSWs notes of 8/10/18:
Depression
Alcohol use d/o
Opioid use d/o
Unspecified mood disorder.
Rule out substance-induced mood disorder.
Rule out unspecified bipolar disorder.
Alcohol use disorder, severe.
Cannabis use disorder.
Hepatitis C.
Pancreatitis by history.
Hypothyroidism.
Past Precipitating Factors- if any:
Alcohol use and homelessness
- Include inpatient and outpatient treatment
Treatment History:
per Nai Falcon LCSWs notes of 8/10/18:
"He reported his timeline included [facility] psychiatric admission earlier this year
, followed by 4 days of Crisis and Respite, followed by [facility] (typically 28-
45 days), followed by 90 days at [facility].  He was discharged to his 
brother's home in [place], [place] and he attends [facility] IOP."
 
History of Suicide Attempts or Gestures
Denied history of suicide attempts, he acknowledges 1 accidental overdose on 
heroin (he is sure it was not intentional)
Substance Abuse History:
Alcohol cannabis and opiates
Allergies:
Coded Allergies:
NO KNOWN ALLERGIES (NONE 18)
 
Home Med List:
The patient ran out of medications and has not been on medications just before 
his admission
- Include any medical condition(s) that may
- impact the patient's recovery/remission
Past Medical History:
History of hepatitis C history of pancreatitis history of hypothyroidism
 
Past History
 
Medical History
Neurological: NONE
EENT: NONE
Cardiovascular: NONE
Respiratory: NONE
Gastrointestinal: pancreatitis
Hepatic: hepatitis C, ELEVATED LIVER ENZYMES
Renal: NONE
Musculoskeletal: sciatica
Psychiatric: anxiety, depression, insomnia, STRESS
Endocrine: hypothyroidism
Blood Disorders: NONE
Cancer(s): NONE
GYN/Reproductive: NONE
History of MRSA: No
History of VRE: No
History of CDIFF: No
Isolation History: Standard
Tetanus Vaccine: 17
 
Surgical History
Surgical History: TONSILLECTOMY
 
Psychiatric Family/Social Hx
 
Family History
Psychiatric Illness:
The records indicated that there is no family history of psychiatric illnesses 
there is a strong family history of alcoholism
Substance Use:
His father and brothers were/are alcoholics and there is also polysubstance 
abuse in the family according to the records
Suicides:
Records indicated no suicides in the family
 
Social History
Living Situation:
Currently homeless
Significant Relationships (family/friends):
He is close to 1 of his 2 brothers both parents are  the patient has a 
28-year-old son but he has no contact with him
Education:
Ninth grade education
Vocation/Occupation:
Unemployed, no income
Legal:
History of arrests for goals, burglary, larceny, breach of peace, and disorderly
conduct.
 
Healthly Behaviors Screening
 
Tobacco Screening
Tobacco Use from ED Docu: Current Daily Use
Daily Tobacco Use Amount/Type: => 5 Cigarettes daily
- If tobacco counseling indicated
- the following topics are required.
- #1 Recognizing dangerous situations.
- #2 Coping Skills.
- #3 Basic information about quitting.
Status of Tobacco Cessation Counseling: #1, #2 AND #3 Completed
Cessation Med Status Nicotine Patch Ordered
 
Alcohol Screening
- ETOH screen POS if BAL >=80 or Audit-C>= M4/F3
Audit-C Score from Diag Assess: 12
Blood Alcohol Level:
Laboratory Tests
 
 
 
 
 0949
 
Toxicology 
 
  Serum Alcohol (<10 MG/DL) 298.0
 
 
 
Alcohol Use Screening Results: Pos per Audit C &/or BAL
- If ETOH counseling indicated
- the following topics are required.
- #1 Express concern about the patient's
- drinking at unhealthy levels, include informing
- of national norms for moderate drinking:
- men <= 14 drinks/week, max 4 drinks/occasion
- women <= 7 drinks/week, max 3 drinks/occasion
- #2 Providing feedback, including linking alcohol to
- negative physical effects (liver injury, hypertension)
- negative emotional effects (relationship problems and
- depression)
- negative occupational consequences (reduced work
- performance)
- #3 Advising the patient to abstain from alcohol or
- to drink below national norms for moderate drinking
- (as listed above).
Status of ETOH Use Counseling: #1, #2 AND #3 Completed.
 
Metabolic Screening
- Screen if on a Neuroleptic Medication
- Metabolic screening should include:
- Blood Pressure, BMI, Glucose or Hgb A1c, & a
- Lipid profile from within the past 365 days.
Metabolic Screening
Not Applicable, patient not on a neuroleptic.
 
 
 
Exam and Plan
 
Mental Status Examination
Ambulation Status:
Patient was steady on his feet
Appearance:
Sunburned face and bloodshot eyes
Attitude towards examiner:
He was calm and cooperative
Psychomotor activity:
Showed normal psychomotor activity
Behavior:
No abnormal behaviors
Quality of speech:
Normal speech
Affect:
He showed reasonable range of affect
Mood:
He reported that has been feeling very depressed lately
Suicidal Ideation:
Reported thoughts of suicide as late as last night
Homicidal Ideation:
Denied homicidal ideation or violent thoughts
Hallucinations:
Denied hallucinations
Paranoid/Delusional Material:
Denied feeling paranoid, there were no delusions during the interview
Difficulties with thought organization:
He was coherent, no significant thought disorder
Insight:
He has partial insight
Judgment:
History suggests poor judgment
Orientation:
He was alert and oriented to place and person
Cognition:
Seems to have minor difficulties with attention and concentration and 
information processing
Memory Function:
Did not seem to have a gross impairment in short-term memory
Estimate of intellectual functioning:
Average
 
Assets/Strengths
Patient Identified Assets/Strengths:
Patient is resilient, has a supportive brother, and resourceful
 
Impression/Plan
Impression and Plan:
57-year-old single white male who presents with thoughts of suicide in the 
context of relapsing.  He presents with sunburn to the face and bloodshot eyes 
and tremulousness.  There is withdrawing from alcohol.
- Include all active medical diagnosis that require tx
DSM 5 Diagnosis(es):
Major depressive disorder
Alcohol use disorder
Hepatitis C
Hypothyroidism
- Initial Tx Plan for Active Psych & Medical Conditions
Treatment Plan:
Inpatient psychiatric care with safety checks every 15 minutes and
Vital signs, CIWA protocol, nursing assessments
Group therapy, activity therapy, milieu therapy
Biopsychosocial assessment, collateral information, and aftercare planning
Ativan on a regular schedule plus as needed for alcohol withdrawal symptoms
- Factors that would help patient function
- in a less restrictive setting.
Factors:
Patient will be discharged once he is safely detoxified and once he has 2 
consecutive days without thoughts of suicide or wishing death

## 2018-08-10 NOTE — HISTORY & PHYSICAL
General Information and HPI
MD Statement:
I have seen and personally examined FE PAGE and documented this H&P.
 
The patient is a 57 year old M who presented with a patient stated chief 
complaint of Depression, Alcohol use. 
 
Source of Information: patient
Exam Limitations: no limitations
History of Present Illness:
57-year-old male with past medical history significant for hepatitis C, 
hypothyroidism, pancreatitis, anxiety, depression, alcohol use who was admitted 
to [facility] with increasing depression and alcohol use.  Patient claims that he 
follows with [facility].  He ran out of his medications therefore currently not taking 
any of his medications.  He also has chronic low back pain.  He has not received
any treatment for his hepatitis C.  He is also on probation.  He has a lot of 
stressors and currently does not work.  He lives with his brother.  He drinks 12
beers a day.  He is feeling more depressed but denies any suicidal or homicidal 
ideation.  He does complain of chronic back pain.  Denies any chest pain 
abdominal pain, nausea, vomiting but did complain of diarrhea.  He denies any 
bright blood per rectum.
 
Allergies/Medications
Allergies:
Coded Allergies:
NO KNOWN ALLERGIES (NONE 02/06/18)
 
Home Med list
Amitriptyline HCl 50 MG TABLET   1 TAB PO QPM MENTAL HEALTH/SLEEP  (Reported)
Aripiprazole (Abilify) 10 MG TABLET   1 TAB PO BID MENTAL HEALTH  (Reported)
Gabapentin 300 MG CAPSULE   1 CAP PO TID PRN DEPRESSION/ANXIETY  (Reported)
Hydroxyzine HCl (hydrOXYzine HCl) 10 MG TABLET   1 TAB PO PRN ANXIETY  (Reported
)
Levothyroxine Sodium (Synthroid) (Unknown Strength) TABLET   50 MCG PO DAILY 
HYPOTHYROID  (Reported)
Tamsulosin HCl 0.4 MG CAP.ER.24H   1 CAP PO DAILY   (Reported)
 
 
Past History
 
Travel History
Traveled to [place] past 21 day No
 
Medical History
Neurological: NONE
EENT: NONE
Cardiovascular: NONE
Respiratory: NONE
Gastrointestinal: pancreatitis
Hepatic: hepatitis C, ELEVATED LIVER ENZYMES
Renal: NONE
Musculoskeletal: sciatica
Psychiatric: anxiety, depression, insomnia, STRESS
Endocrine: hypothyroidism
Blood Disorders: NONE
Cancer(s): NONE
GYN/Reproductive: NONE
History of MRSA: No
History of VRE: No
History of CDIFF: No
Isolation History: Standard
Tetanus Vaccine: 08/05/17
 
Surgical History
Surgical History: non-contributory
 
Past Family/Social History
 
Family History
Relations & Conditions if any
MOTHER
  FH: diabetes mellitus
  FHx: stroke
FATHER
  FHx: stroke
BROTHER
  FH: heart attack
 
 
Psychosocial History
Where do you live? Home
Who Do You Live With? self
Services at Home: None
ETOH Use: alcoholic
Illicit Drug Use: denies illicit drug use
Power of /HCP? yes
 
Review of Systems
 
Review of Systems
Constitutional:
Reports: see HPI. 
EENTM:
Reports: see HPI. 
Cardiovascular:
Reports: see HPI. 
Respiratory:
Reports: see HPI. 
GI:
Reports: see HPI. 
Musculoskeletal:
Reports: see HPI. 
Neurological/Psychological:
Reports: see HPI. 
 
Exam & Diagnostic Data
Last 24 Hrs of Vital Signs/I&O
 Vital Signs
 
 
Date Time Temp Pulse Resp B/P B/P Pulse O2 O2 Flow FiO2
 
     Mean Ox Delivery Rate 
 
08/10 1215 96.9 57  131/83     
 
08/10 1106 96.9 57  131/83     
 
08/10 1030 98.8 63 16 124/78     
 
08/10 1001 98.8 63 15 124/78  97 Room Air  
 
08/10 0600 99.2 62 20 137/86     
 
08/10 0554 99.2 62 20 137/86  94 Room Air  
 
08/10 0400 98.6 61 18 133/79     
 
08/10 0400 98.6 61 18 133/79  95 Room Air  
 
08/10 0259 98.9 60 20 144/76  97 Room Air  
 
08/10 0204 98.9 60 20 144/76     
 
08/10 0034 98.2 78 20 117/82  97 Room Air  
 
08/10 0004 98.2 78 20 117/82     
 
08/09 2208 98.7 80 20 110/70  97 Room Air  
 
08/09 2204 98.7 80 20 110/70     
 
08/09 2015 99.0 66 20 127/77  93 Room Air  
 
08/09 2004 99.0 66 20 127/77     
 
08/09 1804 98.3 90 18 119/77     
 
08/09 1745 98.3 91 18 119/77  94   
 
08/09 1604 97.6 78 20 122/77     
 
08/09 1600 97.6 78 20 122/77  96 Room Air  
 
 
 Intake & Output
 
 
 08/10 1600 08/10 0800 08/10 0000
 
Intake Total   
 
Output Total   
 
Balance   
 
    
 
Patient 166 lb  
 
Weight   
 
 
 
 
Physical Exam
General Appearance Alert, Oriented X3, Cooperative
Skin No Rashes
HEENT PERRLA
Neck Supple
Cardiovascular Regular Rate, Normal S1, Normal S2
Lungs Clear to Auscultation
Abdomen Normal Bowel Sounds, Soft, No Tenderness
Neurological
   Cranial Nerves II through XII:
Intact
Last 24 Hrs of Labs/Riley:
 Laboratory Tests
 
 
 08/09 08/09
 
 1129 0949
 
Chemistry  
 
  Sodium (137 - 145 mmol/L)  142
 
  Potassium (3.5 - 5.1 mmol/L)  4.0
 
  Chloride (98 - 107 mmol/L)  107
 
  Carbon Dioxide (22 - 30 mmol/L)  25
 
  Anion Gap (5 - 16)  10
 
  BUN (9 - 20 mg/dL)  5  L
 
  Creatinine (0.7 - 1.2 mg/dL)  0.6  L
 
  Estimated GFR (>60 ml/min)  > 60
 
  BUN/Creatinine Ratio (7 - 25 %)  8.3
 
  Glucose (65 - 99 mg/dL)  94
 
  Hemoglobin A1c (4.2 - 5.8 %)  5.0
 
  Calcium (8.4 - 10.2 mg/dL)  9.1
 
  Total Bilirubin (0.2 - 1.3 mg/dL)  0.6
 
  AST (17 - 59 U/L)  138  H
 
  ALT (21 - 72 U/L)  111  H
 
  Alkaline Phosphatase (< 127 U/L)  75
 
  Total Protein (6.3 - 8.2 g/dL)  7.8
 
  Albumin (3.5 - 5.0 g/dL)  4.3
 
  Globulin (1.9 - 4.2 gm/dL)  3.5
 
  Albumin/Globulin Ratio (1.1 - 2.2 %)  1.2
 
  Triglycerides (<150 mg/dL)  Pending
 
  Cholesterol (< 200 MG/DL)  Pending
 
  LDL Cholesterol, Calc (65 - 129 mg/dL)  Pending
 
  HDL Cholesterol (40 - 60 mg/dL)  Pending
 
  Cholesterol/HDL Ratio (0.00 - 4.88 %)  Pending
 
  TSH &T3 &Free T4 Intrp (0.27 - 4.20 uIU/mL)  Pending
 
Hematology  
 
  CBC w Diff  NO MAN DIFF REQ
 
  WBC (4.8 - 10.8 /CUMM)  5.5
 
  RBC (4.70 - 6.10 /CUMM)  4.79
 
  Hgb (14.0 - 18.0 G/DL)  16.5
 
  Hct (42 - 52 %)  47.4
 
  MCV (80.0 - 94.0 FL)  99.0  H
 
  MCH (27.0 - 31.0 PG)  34.3  H
 
  MCHC (33.0 - 37.0 G/DL)  34.7
 
  RDW (11.5 - 14.5 %)  15.2  H
 
  Plt Count (130 - 400 /CUMM)  202
 
  MPV (7.4 - 10.4 FL)  8.1
 
  Gran % (42.2 - 75.2 %)  40.4  L
 
  Lymphocytes % (20.5 - 51.1 %)  47.5
 
  Monocytes % (1.7 - 9.3 %)  10.5  H
 
  Eosinophils % (0 - 5 %)  0.8
 
  Basophils % (0.0 - 2.0 %)  0.8
 
  Absolute Granulocytes (1.4 - 6.5 /CUMM)  2.2
 
  Absolute Lymphocytes (1.2 - 3.4 /CUMM)  2.6
 
  Absolute Monocytes (0.10 - 0.60 /CUMM)  0.6
 
  Absolute Eosinophils (0.0 - 0.7 /CUMM)  0
 
  Absolute Basophils (0.0 - 0.2 /CUMM)  0
 
Toxicology  
 
  Urine Opiates Screen (>2000 NG/ML) < 100 
 
  Methadone Screen (>300 NG/ML) < 40 
 
  Barbiturate Screen (>200 NG/ML) < 60 
 
  Ur Phencyclidine Scrn (>25 NG/ML) < 6.00 
 
  Amphetamines Screen (>1000 NG/ML) < 100 
 
  U Benzodiazepines Scrn (>200 NG/ML) < 85 
 
  Urine Cocaine Screen (>300 NG/ML) < 50 
 
  Urine Cannabis Screen (>50 NG/ML) 7.40 
 
  Serum Alcohol (<10 MG/DL)  298.0
 
 
 
 
Assessment/Plan
Assessment:
57-year-old male with past medical history significant for hepatitis C, 
hypothyroidism, pancreatitis, anxiety, depression, alcohol use who was admitted 
to [facility] with increasing depression and alcohol use.
 
Patient's thyroid function test is still pending.  He is continued on his 
levothyroxine.  Patient also is continued on his gabapentin.  I have reviewed 
the rest of his blood work.  He has chronic elevation of his LFTs likely 
secondary to nomination of hepatitis C as well as alcohol use.  Patient is 
currently nontender on abdominal exam.
Further psych management including alcohol intoxication treatment will be upto 
psychiatry.
 
 
 
As Ranked By This Provider
Problem List:
 1. Alcohol abuse
 
 2. Pancreatitis
 
 3. Chronic hepatitis C
 
 4. Depression
 
 
Miscellaneous
 
Miscellaneous Documentation
Attending Case Discussed With:
Brigido Jamison MD. 
 
Primary Care Physician:
Art Barnes
 
Patient sees these Specialists
psychiatry
Level of Patient Care: [facility]

## 2018-08-11 VITALS — DIASTOLIC BLOOD PRESSURE: 87 MMHG | SYSTOLIC BLOOD PRESSURE: 129 MMHG

## 2018-08-11 VITALS — SYSTOLIC BLOOD PRESSURE: 124 MMHG | DIASTOLIC BLOOD PRESSURE: 85 MMHG

## 2018-08-11 VITALS — SYSTOLIC BLOOD PRESSURE: 112 MMHG | DIASTOLIC BLOOD PRESSURE: 84 MMHG

## 2018-08-11 VITALS — DIASTOLIC BLOOD PRESSURE: 81 MMHG | SYSTOLIC BLOOD PRESSURE: 123 MMHG

## 2018-08-11 VITALS — SYSTOLIC BLOOD PRESSURE: 123 MMHG | DIASTOLIC BLOOD PRESSURE: 81 MMHG

## 2018-08-11 VITALS — DIASTOLIC BLOOD PRESSURE: 72 MMHG | SYSTOLIC BLOOD PRESSURE: 106 MMHG

## 2018-08-11 VITALS — SYSTOLIC BLOOD PRESSURE: 106 MMHG | DIASTOLIC BLOOD PRESSURE: 72 MMHG

## 2018-08-11 NOTE — CP SOUTH PROGRESS NOTE PSYCH
Psych (Inpt) Progress Note
Progress Note
Include the following elements, when applicable:
Involvement in the active treatment of the patient with behavioral observations 
of the patient and the patient's response to the treatment.
Review of the ongoing treatment process in the context of the treatment plan.
Indication of how multi-disciplinary staff members are carrying out the 
treatment plan.
Plans for future interventions and recommendations for revision of the treatment
plan.
Liaison with other physicians/providers.
Progress Note:
 
Case diswcussed with [facility] nurse. Pt has been cooperative with care. Visible in
the unit. Upon assessment pt presents as calm and pleasant. reports symptoms of 
withdrawal are mostly controlled by ativan taper. Fair sleep and appetite. 
Interested in anti craving medication.Planing to do an IOP after 
hospitalization. On probation. 
MSE MIddle age man. Disheveled. fair eye contact. Reactive affect. organized, 
linear No SI/HI. No AVH Cog AAox3 I/J Fair. 
 
A/P 56 y/o WM EOTH Use Dx. Finishing detox. Will continue same tx plan. WIll 
disucss anti craving med tomorrow.

## 2018-08-12 VITALS — DIASTOLIC BLOOD PRESSURE: 81 MMHG | SYSTOLIC BLOOD PRESSURE: 108 MMHG

## 2018-08-12 VITALS — DIASTOLIC BLOOD PRESSURE: 86 MMHG | SYSTOLIC BLOOD PRESSURE: 127 MMHG

## 2018-08-12 VITALS — SYSTOLIC BLOOD PRESSURE: 127 MMHG | DIASTOLIC BLOOD PRESSURE: 86 MMHG

## 2018-08-12 VITALS — DIASTOLIC BLOOD PRESSURE: 78 MMHG | SYSTOLIC BLOOD PRESSURE: 121 MMHG

## 2018-08-12 VITALS — SYSTOLIC BLOOD PRESSURE: 108 MMHG | DIASTOLIC BLOOD PRESSURE: 81 MMHG

## 2018-08-12 VITALS — SYSTOLIC BLOOD PRESSURE: 134 MMHG | DIASTOLIC BLOOD PRESSURE: 82 MMHG

## 2018-08-12 NOTE — CP SOUTH PROGRESS NOTE PSYCH
Psych (Inpt) Progress Note
Progress Note
Include the following elements, when applicable:
Involvement in the active treatment of the patient with behavioral observations 
of the patient and the patient's response to the treatment.
Review of the ongoing treatment process in the context of the treatment plan.
Indication of how multi-disciplinary staff members are carrying out the 
treatment plan.
Plans for future interventions and recommendations for revision of the treatment
plan.
Liaison with other physicians/providers.
Progress Note:
 
Sawyer today has remained mostly in bed; isolates in room Mentions he still feels 
tired and is trying to " catch up from all the lost sleep" while he was 
drinking. Denies any clear periods of daniel/hypomania. mood is stable. Future 
oriented. Wants to restart IOP. Tolerating medications well. 
Finishing detox, fair appetite. MIld tremors.  We duscussed option of relapse 
prevention however his LFTS still abnormal. 
 
MSE Middle age man. poorly groomed. Fair eye contact.  Restricted affect No SI/
HI .No AVH. Organized and linear. Cog AAox3 I/J Fair. 
 
A/P 58 y/o WM EOTH Use Dx. SIMD Vs MDD. Almost done with detox. Motivated for ESCOTO
tx. COntinue same tx plan.

## 2018-08-13 VITALS — SYSTOLIC BLOOD PRESSURE: 116 MMHG | DIASTOLIC BLOOD PRESSURE: 85 MMHG

## 2018-08-13 VITALS — DIASTOLIC BLOOD PRESSURE: 99 MMHG | SYSTOLIC BLOOD PRESSURE: 124 MMHG

## 2018-08-13 VITALS — SYSTOLIC BLOOD PRESSURE: 126 MMHG | DIASTOLIC BLOOD PRESSURE: 93 MMHG

## 2018-08-13 VITALS — SYSTOLIC BLOOD PRESSURE: 124 MMHG | DIASTOLIC BLOOD PRESSURE: 99 MMHG

## 2018-08-13 VITALS — SYSTOLIC BLOOD PRESSURE: 129 MMHG | DIASTOLIC BLOOD PRESSURE: 78 MMHG

## 2018-08-13 NOTE — SOCIAL WORKER PROG NOTE PSYCH
Social Work Progress Note
Progress Note
[facility] review entered:
 
 
 
Determination Status:  **************************** PENDED *********************
******* 
 
 
 
 
The services requested require additional review. You will be contacted 
regarding the status of this request if further information is needed. An 
authorization decision will be made within the required timeframes and details 
of that decision may be found under the member's authorization history.
 
 
 
 
Member Name Member ID Member  Subscriber Name  Subscriber ID 
 
FE MATYASOVSZKY [id number] FE [id number]  [id number]
 
    
 
Pended Authorization # Client Authorization # Type of Request  
 
[id number]  CONCURRENT   
 
    
 
Date of Admission/ Start of Services Requested From Submission Date  
 
08/10/2018  2018  2018   
 
    
 
Level of Service  Type of Service Level of Care  Type of Care  
 
INPATIENT/[facility] Mental Health Inpatient  Inpatient Hospital - Inpatient [facility] 
 
 
    
 
Reason Code    
 
P76

## 2018-08-13 NOTE — SOCIAL WORKER PROG NOTE PSYCH
Social Work Progress Note
Progress Note
This writer met with patient.  Patient stated that he came to the hospital due 
to increased depression "and I started drinking."  He is refusing rehab, stating
that he would prefer to return to [facility] first.  He stated that he 
completed 10 months at [facility] and does not feel that rehab is necessary 
at this time.  Patient denied SI/HI/AH/VH.  Patient stated that he is staying 
with his brother, Suraj Cee ([phone]), and is agreeable to this 
writer contacting his brother to schedule a family meeting.  However, the 
patient stated that his brother would not likely agree to a family meeting.  He 
plans to return to living with his brother upon discharge.  We discussed AA, 
however, patient stated that he is not willing to attend AA as it triggers him 
to use.
 
Patient stated that he receives state assistance and food stamps.  He stated 
that he is currently on probation due to violating a protective order and 
threatening.  He was previously incarcerated and released on 2/1/18.  Patient 
stated that he is on probation for 2 years, but is unsure as to who his 
 is (through [place] [facility]).  Patient denied any 
DCF involvement, past or current.
 
2:10pm
This writer left a vm for Krista at [facility] ([phone]) regarding patient's 
interest in [facility].  A call back number was provided.

## 2018-08-13 NOTE — CP SOUTH PROGRESS NOTE PSYCH
Psych (Inpt) Progress Note
Progress Note
Vital Signs
 
 
Date Time Temp Pulse B/P
 
08/13 1201  81 126/93
 
08/13 0807 97.1 82 116/85
 
08/13 0803 97.6 74 116/85
 
08/12 1957 96.7 93 127/86
 
pt. seems brighter in affect. no longer flushed-looking, fine tremor, feels 
close to ready for discharge. Denies any clear periods of daniel/hypomania. mood 
is stable. Future oriented. Wants to restart IOP. Tolerating medications well. 
Middle age man. poorly groomed. Fair eye contact.  Restricted affect 
No SI/HI .
No AVH. 
Organized and linear. 
Alert and oriented to time, place, and person.
 
Assessment
56 y/o WM EOTH Use Dx. SIMD Vs MDD. Almost done with detox. Motivated for ESCOTO tx.
 
 
plan update:
Change Regular Ativan to 1mg BID
 
 
 
Aripiprazole 5 MG DAILY 08/10 1010 AC 08/13
 
Gabapentin 600 MG Q4 HRS AS NEEDED PRN 08/10 1230 AC 08/13
 
Ibuprofen 400 MG Q6P PRN 08/10 1015 AC 
 
Levothyroxine Sodium 0.05 MG DAILY AC 08/10 1010 AC 08/13
 
Lorazepam 2 MG Q2P PRN 08/10 1015 AC 
 
Lorazepam 1 MG Q2P PRN 08/10 1015 AC 08/11
 
Multivitamins 1 TAB DAILY 08/10 1012 AC 08/13
 
 
 
 
 
 
Lorazepam 0.5 MG ONCE 08/15 0000 AC 
 
  PO 08/15 0001  
 
Lorazepam 0.5 MG Q6H 08/14 0000 AC 
 
Lorazepam 0.5 MG ONCE ONE 08/13 1800 AC 
 
Lorazepam 2 MG Q2P PRN 08/10 1015 AC 
 
Lorazepam 1 MG Q2P PRN 08/10 1015 AC 08/11
 
  PO   1959
 
Magnesium Hydroxide 30 ML AT BEDTIME AS NEED.. 08/10 1015 AC 
 
  PO   
 
Multivitamins 1 TAB DAILY 08/10 1012 AC 08/13
 
  PO   0805
 
Nicotine 2 MG Q2P PRN 08/10 1015 AC 
 
  PO

## 2018-08-14 VITALS — DIASTOLIC BLOOD PRESSURE: 83 MMHG | SYSTOLIC BLOOD PRESSURE: 118 MMHG

## 2018-08-14 VITALS — DIASTOLIC BLOOD PRESSURE: 80 MMHG | SYSTOLIC BLOOD PRESSURE: 137 MMHG

## 2018-08-14 VITALS — SYSTOLIC BLOOD PRESSURE: 118 MMHG | DIASTOLIC BLOOD PRESSURE: 83 MMHG

## 2018-08-14 VITALS — SYSTOLIC BLOOD PRESSURE: 123 MMHG | DIASTOLIC BLOOD PRESSURE: 86 MMHG

## 2018-08-14 NOTE — SOCIAL WORKER PROG NOTE PSYCH
Social Work Progress Note
Progress Note
This writer met with patient.  He stated that he succeeded in reaching probation
, however, is still uncertain as to who he has been assigned to for a probation 
officer.  Patient was informed that this writer received a vm from [facility] (
Krista) recommending that he enter rehab.  Krista also added that if he 
refuses, he may return to [facility] for IOP beginning Thursday or Friday at 9am 
this week if he discharges in time.  Patient maintains that he refuses to go to 
rehab at this time - "I will next time if I relapse."  He accepted the IOP start
date of Thursday or Friday of this week.
 
This writer and patient attempted to reach his brother, Suraj, by phone ([phone]
[phone]) regarding a family meeting and to confirm that the patient may return 
to stay with him as he had been doing.  A vm was left at 2:25pm.  Patient stated
that his brother would likely refuse a family meeting.  This writer provided 
Suraj with a call back number.
 
Patient expressed interest in discharging tomorrow, 8/15/18.  He denied SI/HI/AH
/VH.  He stated that he will speak with his PO (once identified) about sober 
houses or recovery houses.  He stated that he did not want resources/contact 
information at this time.  Patient stated that he plans to return to his brother
's home upon discharge.  He denied any access to weapons/guns.

## 2018-08-14 NOTE — CP SOUTH PROGRESS NOTE PSYCH
Psych (Inpt) Progress Note
Progress Note
Vital Signs
 
 
Date Time Temp Pulse B/P B/P Pulse O2 O2 Flow FiO2
 
08/14 1224 98.4 81 123/86     
 
08/14 0816 97.2 90 118/83     
 
08/14 0757 97.2 90 118/83     
 
08/13 2234 97.1 100 124/99     
 
08/13 2004 97.1 100 124/99     
 
 
The pt. seems more alert, and brighter in affect. He is less tremulous. He feels
ready for discharge. This morning he asked for an increase in Abilify dose but 
was struggling to justify why except that it was the way it was prescribed 
before he ran out, he finally came up with "to stabilize my mood."
He he did acknowledge some depressive symptoms but denied feeling hopeless and 
denied feeling worthless.  He also denied having any wishes of death or thinking
of suicide.
He denied having violent thoughts or thoughts of homicide.  He reported that he 
has been to be living with his brother in [place] and his brother or his 
nephew would be driving him to the intensive outpatient program at [facility] in 
[place].
He reported that he is tolerating the medication changes that we made over the 
past few days.  He denied any major side effects.  
He denied hallucinations and did not seem to be hallucinating.  He denied 
feeling paranoid and there were no delusions during the interview.  He was 
coherent without any significant formal thought disorder.
 
Assessment update:
58 y/o WM EOTH Use Dx. SIMD Vs MDD. Almost done with detox. Motivated for ESCOTO tx.
 
The patient is detoxified without any major incidents, he feels ready for 
discharge and he is denying thoughts of suicide or homicide.  He is denying any 
hallucinations or paranoid delusions and does not exhibit thought disorder and 
does not bring up delusions during the interview.  He reported that she will be 
staying with his brother in [place] and coming to [facility]'s intensive 
outpatient program.  He reported that his brother or his nephew would be driving
him
 
Plan update:
Discontinue CIWA and, therefore, discontinue Ativan coverage
Increase aripiprazole to 10 mg daily
Change gabapentin to 600 mg in the morning 600 mg around 2 PM and 1200 mg at 
bedtime because Ativan will be cut down to 0.5 mg twice daily
 
 
 
Ibuprofen 400 MG Q6P PRN 08/10 1015
 
Levothyroxine Sodium 0.05 MG DAILY AC 08/10 1010
 
Multivitamins 1 TAB DAILY 08/10 1012

## 2018-08-15 VITALS — DIASTOLIC BLOOD PRESSURE: 85 MMHG | SYSTOLIC BLOOD PRESSURE: 113 MMHG

## 2018-08-15 NOTE — CP SOUTH PROGRESS NOTE PSYCH
Psych (Inpt) Progress Note
Progress Note
The pt. was alert and oriented. His affect was bright. He was not tremulous. He 
feels ready for discharge. This morning, he reported that he was not feeling 
depressed. He denied feeling hopeless, denied feeling worthless, denied wishing 
death, and denied thinking of suicide. Sawyer denied having violent thoughts or 
thoughts of homicide.  He reported that he has been tolerating the medications 
well. He denied any major side effects. He denied hallucinations and did not 
seem to be hallucinating.  He denied feeling paranoid and there were no 
delusions during the interview.  He was coherent/no significant formal thought 
disorder.
 
Assessment update:
Sawyer is a 57-year-old white male with EOTH Use Dx. SIMD Vs MDD. Almost done with 
detox. Motivated for ESCOTO tx. 
The patient is detoxified without any major incidents, he feels ready for 
discharge and he is denying thoughts of suicide or homicide.  He is denying any 
hallucinations or paranoid delusions and does not exhibit thought disorder and 
does not bring up delusions during the interview.  He reported that she will be 
staying with his brother in [place] and going to [facility]'s intensive 
outpatient program.  He reported that his brother or his nephew would be driving
him
 
Plan update:
D/C Home
F/U is with Dual Track IOP at [facility]

## 2018-08-15 NOTE — SOCIAL WORKER PROG NOTE PSYCH
Social Work Progress Note
Progress Note
This writer met with patient.  He reported a "good" mood.  He denied SI/HI/AH/
VH.  He stated that he plans to return to live with his brother and will speak 
with his  about sober housing.  He stated that he did not want/
need any resources for sober houses at this time.  Patient identified a safety 
plan in which he would "call a hotline or ambulance."  He accepted the crisis 
numbers/warm lines upon discharge today.  Patient's brother could not be reached
by phone and did not return the call (Dr. Gharaibeh was informed).  Patient did 
not have another way of reaching him.  Patient requested to call probation to 
inform of discharge, which he did at 10:20am prior to discharging.  He stated 
that he did not need this writer to contact probation at this time.
 
This writer confirmed that the patient can return to [facility] tomorrow with 
Krista ([phone]).  Krista also stated that the patient may utilize walk 
in hours with his clinician, Cynthia Javier, today.  Patient was informed of this 
and also accepted the plan to attend [facility] tomorrow at 9am.  Patient was also 
informed of the smoking cessation group meeting information.
 
6pm: A vm was left for Willa at [facility] informing her that the patient health 
summary had been faxed at her attention.
Faxed Referral(s)
   Referred To: [facility]
   Transition of Care Documents sent: Health Summary
   Faxed to: [facility], attn: Willa
   Fax #: [phone]
   Faxed by: TREVER Zuniga LCSW
   Date faxed: 08/15/18
   Time Faxed: [phone]

## 2018-08-15 NOTE — PATIENT DISCHARGE INSTRUCTIONS
Psych Discharge Inst
 
General Discharge Information
Reason for Admission:
thoughts of suicide while inebriated
 
Psy Discharge Primary Diag+ Major Depressive Disorder
Psy Discharge Secondary Diag+ Alcohol Use Disorder, Hepatitis C, Hypothyroidism
Summary Tests/Major Procedures
Last 24 Hrs of Labs/Riley:
 Laboratory Tests
 08/09 08/09
 1129 0949
Chemistry  
  Sodium (137 - 145 mmol/L)  142
  Potassium (3.5 - 5.1 mmol/L)  4.0
  Chloride (98 - 107 mmol/L)  107
  Carbon Dioxide (22 - 30 mmol/L)  25
  Anion Gap (5 - 16)  10
  BUN (9 - 20 mg/dL)  5  L
  Creatinine (0.7 - 1.2 mg/dL)  0.6  L
  Estimated GFR (>60 ml/min)  > 60
  BUN/Creatinine Ratio (7 - 25 %)  8.3
  Glucose (65 - 99 mg/dL)  94
  Hemoglobin A1c (4.2 - 5.8 %)  5.0
  Calcium (8.4 - 10.2 mg/dL)  9.1
  Total Bilirubin (0.2 - 1.3 mg/dL)  0.6
  AST (17 - 59 U/L)  138  H
  ALT (21 - 72 U/L)  111  H
  Alkaline Phosphatase (< 127 U/L)  75
  Total Protein (6.3 - 8.2 g/dL)  7.8
  Albumin (3.5 - 5.0 g/dL)  4.3
  Globulin (1.9 - 4.2 gm/dL)  3.5
  Albumin/Globulin Ratio (1.1 - 2.2 %)  1.2
  Triglycerides (<150 mg/dL)  Pending
  Cholesterol (< 200 MG/DL)  Pending
  LDL Cholesterol, Calc (65 - 129 mg/dL)  Pending
  HDL Cholesterol (40 - 60 mg/dL)  Pending
  Cholesterol/HDL Ratio (0.00 - 4.88 %)  Pending
  TSH &T3 &Free T4 Intrp (0.27 - 4.20 uIU/mL)  Pending
Hematology  
  CBC w Diff  NO MAN DIFF REQ
  WBC (4.8 - 10.8 /CUMM)  5.5
  RBC (4.70 - 6.10 /CUMM)  4.79
  Hgb (14.0 - 18.0 G/DL)  16.5
  Hct (42 - 52 %)  47.4
  MCV (80.0 - 94.0 FL)  99.0  H
  MCH (27.0 - 31.0 PG)  34.3  H
  MCHC (33.0 - 37.0 G/DL)  34.7
  RDW (11.5 - 14.5 %)  15.2  H
  Plt Count (130 - 400 /CUMM)  202
  MPV (7.4 - 10.4 FL)  8.1
  Gran % (42.2 - 75.2 %)  40.4  L
  Lymphocytes % (20.5 - 51.1 %)  47.5
  Monocytes % (1.7 - 9.3 %)  10.5  H
  Eosinophils % (0 - 5 %)  0.8
  Basophils % (0.0 - 2.0 %)  0.8
  Absolute Granulocytes (1.4 - 6.5 /CUMM)  2.2
  Absolute Lymphocytes (1.2 - 3.4 /CUMM)  2.6
  Absolute Monocytes (0.10 - 0.60 /CUMM)  0.6
  Absolute Eosinophils (0.0 - 0.7 /CUMM)  0
  Absolute Basophils (0.0 - 0.2 /CUMM)  0
Toxicology  
  Urine Opiates Screen (>2000 NG/ML) < 100 
  Methadone Screen (>300 NG/ML) < 40 
  Barbiturate Screen (>200 NG/ML) < 60 
  Ur Phencyclidine Scrn (>25 NG/ML) < 6.00 
  Amphetamines Screen (>1000 NG/ML) < 100 
  U Benzodiazepines Scrn (>200 NG/ML) < 85 
  Urine Cocaine Screen (>300 NG/ML) < 50 
  Urine Cannabis Screen (>50 NG/ML) 7.40 
  Serum Alcohol (<10 MG/DL)  298.0
 
 
Studies Pending at DC:
None
 
Patient Instructions
Contact Information
Your Psychiatrist on [facility] was Gharaibeh MD,Numan
 
* If you are experiencing an emergency related to this hospitalization, please 
call [phone] to contact the treating psychiatrist or the psychiatrist-on-
call.
 
* To Request a copy of your medical records, please contact the Medical Records 
Department at [phone].
 
* To request results of studies pending at the time of discharge, please call 
[phone].
 
* Continue your Medications until directed to stop by your Healthcare provider.
 
General Medication Information
Please continue to take your new medications and your continued home medications
, unless otherwise indicated on your discharge medication list, or unless 
directed by your MD or APRN to stop them.
 
 
Special Instructions
Diet Regular
Activity Normal
- Tobacco Use Treatment Offered
Post DC Medications Offered: Refused Tob Medication Tx
Post DC Tobacco Treatment Plan: Refused Tobacco Tx Pgm
- EtOH/Drug Use D/O Treatment Offered
Post DC Medications Offered: Med Not Indicated for D/O (liver enzymes were still
eleva)
Post DC EtOH/SubAbuse TX Plan: Other SubAbuse/Dual Pgm
Metabolic Screening
Patient on a neuroleptic(s) .
     Enter below results for Hemoglobin A1C, 
     and lipid panel if obtained during the last 365 days.
 
BMI: 23.700     
Blood Pressure: 113/85
Laboratory Results From [facility] (If applicable):
 
 
Cholesterol 220 MG/DL H 08/09/18 0949
 
Cholesterol/HDL Ratio 3 % 08/09/18 0949
 
HDL Cholesterol 87 mg/dL H 08/09/18 0949
 
Hemoglobin A1c 5.0 % 08/09/18 0949
 
LDL Cholesterol, Calc 120 mg/dL 08/09/18 0949
 
Triglycerides 69 mg/dL 08/09/18 0949
 
 
 
Advance Directives
Does the Patient have Medical Advance Directives No/Refused further info
Does Pt have Psychiatric Advance Directives? No/Refused further info
Does Patient have a Designated Surrogate Decision Maker: No
Information About Psychiatric Advance Directives Provided? Refused
 
Discharge Plan
Post Hospital Treatment Plan:
[facility] IOP, Dual Track

## 2018-09-04 ENCOUNTER — HOSPITAL ENCOUNTER (INPATIENT)
Dept: HOSPITAL 68 - ERH | Age: 58
LOS: 9 days | DRG: 754 | End: 2018-09-13
Attending: PSYCHIATRY & NEUROLOGY | Admitting: PSYCHIATRY & NEUROLOGY
Payer: COMMERCIAL

## 2018-09-04 VITALS — DIASTOLIC BLOOD PRESSURE: 62 MMHG | SYSTOLIC BLOOD PRESSURE: 114 MMHG

## 2018-09-04 VITALS — DIASTOLIC BLOOD PRESSURE: 90 MMHG | SYSTOLIC BLOOD PRESSURE: 140 MMHG

## 2018-09-04 VITALS — DIASTOLIC BLOOD PRESSURE: 67 MMHG | SYSTOLIC BLOOD PRESSURE: 111 MMHG

## 2018-09-04 VITALS — BODY MASS INDEX: 23.62 KG/M2 | WEIGHT: 165 LBS | HEIGHT: 70 IN

## 2018-09-04 DIAGNOSIS — F10.10: ICD-10-CM

## 2018-09-04 DIAGNOSIS — F32.9: Primary | ICD-10-CM

## 2018-09-04 LAB
ABSOLUTE GRANULOCYTE CT: 1.9 /CUMM (ref 1.4–6.5)
BASOPHILS # BLD: 0 /CUMM (ref 0–0.2)
BASOPHILS NFR BLD: 0.7 % (ref 0–2)
EOSINOPHIL # BLD: 0 /CUMM (ref 0–0.7)
EOSINOPHIL NFR BLD: 0.5 % (ref 0–5)
ERYTHROCYTE [DISTWIDTH] IN BLOOD BY AUTOMATED COUNT: 15.7 % (ref 11.5–14.5)
GRANULOCYTES NFR BLD: 37.8 % (ref 42.2–75.2)
HCT VFR BLD CALC: 46.6 % (ref 42–52)
LYMPHOCYTES # BLD: 2.7 /CUMM (ref 1.2–3.4)
MCH RBC QN AUTO: 34.3 PG (ref 27–31)
MCHC RBC AUTO-ENTMCNC: 34 G/DL (ref 33–37)
MCV RBC AUTO: 101 FL (ref 80–94)
MONOCYTES # BLD: 0.4 /CUMM (ref 0.1–0.6)
PLATELET # BLD: 156 /CUMM (ref 130–400)
PMV BLD AUTO: 7.9 FL (ref 7.4–10.4)
RED BLOOD CELL CT: 4.61 /CUMM (ref 4.7–6.1)
WBC # BLD AUTO: 5.1 /CUMM (ref 4.8–10.8)

## 2018-09-04 PROCEDURE — G0480 DRUG TEST DEF 1-7 CLASSES: HCPCS

## 2018-09-04 NOTE — ED GENERAL ADULT
History of Present Illness
 
General
Chief Complaint: Psychiatric Related Complaint
Stated Complaint: +SI/HI,ETOH
Source: patient
Exam Limitations: intoxication
 
Vital Signs & Intake/Output
Vital Signs & Intake/Output
 Vital Signs
 
 
Date Time Temp Pulse Resp B/P B/P Pulse O2 O2 Flow FiO2
 
     Mean Ox Delivery Rate 
 
09/10 1958 97.8 92  132/83     
 
09/10 1952 97.8 92  132/83     
 
09/10 1633  84  129/84     
 
09/10 1250  79  113/73     
 
09/10 0903 96.4 75 18 124/92     
 
09/10 0805  75  124/92     
 
09/10 0728 96.4 75  124/92     
 
 
 
Allergies
Coded Allergies:
NO KNOWN ALLERGIES (NONE 02/06/18)
 
Reconcile Medications
Amitriptyline HCl 50 MG TABLET   1 TAB PO QPM MENTAL HEALTH/SLEEP
Aripiprazole (Abilify) 10 MG TABLET   10 MG PO DAILY mood stability
Gabapentin 300 MG CAPSULE   2 CAP PO TID PRN DEPRESSION/ANXIETY
Levothyroxine Sodium (Synthroid) 50 MCG TABLET   50 MCG PO DAILY HYPOTHYROID
Tamsulosin HCl 0.4 MG CAP.ER.24H   1 CAP PO DAILY 
 
Triage Note:
PT BIBA FROM HOME. PT STATED TO EMS THAT HE WAS
 PLANNING ON HARMING HIMSELF WITH A KNIFE. PER EMS
 NO KNIFE WAS FOUND ON PT. PT ALSO STATED TO EMS "I
 AM GOING TO HURT ANYONE WHO WANTS TO HURT ME." PT
 PRESENTS HIGHLY INTOXICATED. PT STATED THAT HE
 DRANK ALCOHOL TODAY BUT DID NOT SPECIFY HOW MUCH.
 PT AGITATED.
Triage Nurses Notes Reviewed? yes
HPI:
This is a 57-year-old male with history of chronic alcoholism presenting to the 
emergency department with alcohol intoxication and suicidal ideation.  Patient 
states that he was considering stabbing himself to death.  He endorses heavy 
alcohol/liquor intake today.  Denies any chest pain or difficulty breathing, 
nausea, vomiting.  He endorses mild epigastric abdominal discomfort.  He denies 
any coingestions or intentional self-harm.  He denies any recent illness, travel
, trauma.
(Brigido CHAPMAN,Jason)
 
Past History
 
Travel History
Traveled to [place] past 21 day No
 
Medical History
Any Pertinent Medical History? see below for history
Neurological: NONE
EENT: NONE
Cardiovascular: NONE
Respiratory: NONE
Gastrointestinal: pancreatitis
Hepatic: hepatitis C, ELEVATED LIVER ENZYMES
Renal: NONE
Musculoskeletal: sciatica
Psychiatric: anxiety, depression, insomnia, STRESS
Endocrine: hypothyroidism
Blood Disorders: NONE
Cancer(s): NONE
GYN/Reproductive: NONE
History of MRSA: No
History of VRE: No
History of CDIFF: No
Tetanus Vaccine: 08/05/17
 
Surgical History
Surgical History: non-contributory
 
Psychosocial History
Who do you live with Other (see notes)
Services at Home None
What is your primary language English
 
Family History
Family History, If Any:
MOTHER
  FH: diabetes mellitus
  FHx: stroke
FATHER
  FHx: stroke
BROTHER
  FH: heart attack
 
Hx Contributory? No
(Jason Fofana MD)
 
Review of Systems
 
Review of Systems
Constitutional:
Reports: no symptoms. 
Respiratory:
Reports: no symptoms. 
Cardiovascular:
Reports: no symptoms. 
GI:
Reports: see HPI. 
Comments
Review of systems somewhat limited by active alcohol intoxication, however 
patient is able to deny any acute pain apart from mild epigastric discomfort.  
He does endorse active suicidal ideation.  He denies hallucinations.
(Jason Fofana MD)
 
Physical Exam
 
Physical Exam
General Appearance: well developed/nourished, no apparent distress, comfortable
Head: atraumatic, normal appearance
Eyes:
Bilateral: normal appearance, PERRL, EOMI. 
Ears, Nose, Throat: normal pharynx, normal ENT inspection
Neck: normal inspection, supple, full range of motion
Respiratory: normal breath sounds, chest non-tender, no respiratory distress, 
lungs clear
Cardiovascular: regular rate/rhythm, edema, normal peripheral pulses
Gastrointestinal: soft, non-tender
Back: normal inspection, normal range of motion
Extremities: normal inspection, normal capillary refill, normal range of motion
Neurologic/Psych: no motor/sensory deficits, oriented x 3
Comments:
Moderately disheveled middle-aged male, no acute distress.  Alert and oriented 
x4, no gross neurologic deficits, smells of EtOH.  Gait not initially assessed. 
No obvious trauma.  Abdominal exam benign.
 
Core Measures
ACS in differential dx? No
CVA/TIA Diagnosis: No
Sepsis Present: No
Sepsis Focused Exam Completed? No
(Jason Fofana MD)
 
Progress
Differential Diagnoses
I considered the following diagnoses in my evaluation of the patient: Appears 
suicidal ideation in the setting of alcohol intoxication.  Low suspicion for 
poly-substance abuse or other toxic ingestion.  Mild concern for pink otitis 
related to alcohol ingestion.  Doubt acute severe metabolic derangement or 
occult infectious process.  Doubt acute traumatic process at this time.
 
Plan of Care:
 Orders
 
 
Procedure Date/time Status
 
SUB HSP (15 MIN) 09/09  UNK Complete
 
SUB HSP (15 MIN) 09/08  UNK Complete
 
 
 Current Medications
 
 
  Sig/Leyda Start time  Last
 
Medication Dose  Stop Time Status Admin
 
Aripiprazole 10 MG BID 09/08 2100 AC 09/10
 
(Abilify)     0903
 
Gabapentin 600 MG 0900,1300 09/08 1300 AC 09/10
 
(Neurontin)     1250
 
Gabapentin 900 MG AT BEDTIME 09/06 2100 AC 09/09
 
(Neurontin)     2137
 
Amitriptyline HCl 50 MG AT BEDTIME 09/05 2100 AC 09/09
 
(Elavil 50 MG Tablet)     2136
 
Al Hydroxide/Mg  30 ML Q4-6 PRN PRN 09/05 1100 AC 
 
Hydroxide     
 
(Maalox Plus)     
 
Benztropine Mesylate 1 MG Q6P PRN 09/05 1100 AC 
 
(Cogentin 1 MG      
 
Tablet)     
 
Benztropine Mesylate 1 MG Q6P PRN 09/05 1100 AC 
 
(Cogentin)     
 
Haloperidol 5 MG Q6P PRN 09/05 1100 AC 
 
(Haldol)     
 
Haloperidol 5 MG Q6P PRN 09/05 1100 AC 
 
(Haldol)     
 
Ibuprofen 600 MG Q6P PRN 09/05 1100 AC 09/07
 
(Motrin)     1224
 
Lorazepam 2 MG Q2P PRN 09/05 1100 AC 
 
(Ativan)     
 
Lorazepam 1 MG Q2P PRN 09/05 1100 AC 09/06
 
(Ativan)     1613
 
Lorazepam 2 MG Q6P PRN 09/05 1100 AC 
 
(Ativan)     
 
Magnesium Hydroxide 30 ML AT BEDTIME AS NEED.. 09/05 1100 AC 
 
(Milk Of Magnesia)     
 
Nicotine 2 MG Q2P PRN 09/05 1100 AC 09/10
 
(Nicotine)     1557
 
Levothyroxine Sodium 0.05 MG DAILY AC 09/05 1051 AC 09/10
 
(Synthroid)     0630
 
Tamsulosin HCl 0.4 MG DAILY 09/05 1050 AC 09/10
 
(Flomax)     0903
 
Multivitamins 1 TAB DAILY 09/05 1048 AC 09/10
 
(Theragran Vitamins)     0903
 
 
Initial labs consistent with mild dehydration.  Patient well-appearing on 
reassessment.  Continues to be intoxicated on my reassessment.
Initial ED EKG: none
(Brigido CHAPMAN,Jason)
 
Departure
 
Departure
Time of Disposition: 1814
Disposition: STILL A PATIENT
Condition: Stable
Clinical Impression
Primary Impression: Alcohol intoxication
Secondary Impressions: Suicidal ideation
Referrals:
Art Barnes (PCP/Family)
 
Departure Forms:
Customer Survey
General Discharge Information
(Jason Fofana MD)
 
Departure
Comments
pt signed out to dr. guajardo, 9/5/18, 7am.
(Rosa CHAPMAN,Arsenio SALAS)
 
Resident Co-Sign Statement
Statement:
ED Attending supervision documentation-
 
[] I saw and evaluated the patient. I have also reviewed all the pertinent lab 
results and diagnostic results. I agree with the findings and the plan of care 
as documented in the Resident's documentation. 
 
[x] I have reviewed the ED Record and agree with the Resident's documentation.
 
[] Additions or exceptions (if any) to the Resident's note and plan are 
summarized below:
[]
 
(Pedrito Bro DO)
 
Critical Care Note
 
Critical Care Note
Critical Care Time: non-applicable
(Brigido CHAPMAN,Jason)
 
Benztropine Mesylate 1 MG Q6P PRN 09/05 1100 UNVr 
 
(Cogentin 1 MG      
 
Tablet)     
 
Benztropine Mesylate 1 MG Q6P PRN 09/05 1100 UNVr 
 
(Cogentin)     
 
Haloperidol 5 MG Q6P PRN 09/05 1100 UNVr 
 
(Haldol)     
 
Haloperidol 5 MG Q6P PRN 09/05 1100 UNVr 
 
(Haldol)     
 
Ibuprofen 600 MG Q6P PRN 09/05 1100 UNVr 
 
(Motrin)     
 
Lorazepam 2 MG Q2P PRN 09/05 1100 UNVr 
 
(Ativan)     
 
Lorazepam 1 MG Q2P PRN 09/05 1100 UNVr 
 
(Ativan)     
 
Lorazepam 2 MG Q6P PRN 09/05 1100 UNVr 
 
(Ativan)     
 
Magnesium Hydroxide 30 ML AT BEDTIME PRN 09/05 1100 UNVr 
 
(Milk Of Magnesia)     
 
Nicotine 2 MG Q2P PRN 09/05 1100 UNVr 
 
(Nicotine)     
 
Aripiprazole 10 MG DAILY 09/05 1051 UNVr 09/05
 
(Abilify)     1155
 
Gabapentin 600 MG TID 09/05 1051 UNVr 09/05
 
(Neurontin)     1155
 
Levothyroxine Sodium 0.05 MG DAILY AC 09/05 1051 UNVr 09/05
 
(Synthroid)     1155
 
Tamsulosin HCl 0.4 MG DAILY 09/05 1050 UNVr 09/05
 
(Flomax)     1155
 
Folic Acid 1 MG DAILY 09/05 1048 UNVr 09/05
 
(Folic Acid)   09/07 0901  1155
 
Multivitamins 1 TAB DAILY 09/05 1048 UNVr 09/05
 
(Theragran Vitamins)     1155
 
Thiamine HCl 100 MG DAILY 09/05 1048 UNVr 09/05
 
(Vitamin B1)   09/07 0901  1155
 
 
 Laboratory Tests
 
 
 
09/04/18 1611:
Urine Opiates Screen < 100, Methadone Screen < 40, Barbiturate Screen < 60, Ur 
Phencyclidine Scrn < 6.00, Amphetamines Screen 105, U Benzodiazepines Scrn < 85,
Urine Cocaine Screen < 50, Urine Cannabis Screen < 5.00
 
09/04/18 1609:
Anion Gap 9, Estimated GFR > 60, BUN/Creatinine Ratio 7.1, Glucose 103  H, 
Calcium 9.2, Total Bilirubin 0.5, AST 96  H, ALT 76  H, Alkaline Phosphatase 59,
Total Protein 7.1, Albumin 4.1, Globulin 3.0, Albumin/Globulin Ratio 1.4, Lipase
193, TSH &T3 &Free T4 Intrp 0.619, CBC w Diff NO MAN DIFF REQ, RBC 4.61  L, MCV 
101.0  H, MCH 34.3  H, MCHC 34.0, RDW 15.7  H, MPV 7.9, Gran % 37.8  L, 
Lymphocytes % 52.5  H, Monocytes % 8.5, Eosinophils % 0.5, Basophils % 0.7, 
Absolute Granulocytes 1.9, Absolute Lymphocytes 2.7, Absolute Monocytes 0.4, 
Absolute Eosinophils 0, Absolute Basophils 0, Serum Alcohol 373.0
Initial labs consistent with mild dehydration.  Patient well-appearing on 
reassessment.  Continues to be intoxicated on my reassessment.
Initial ED EKG: none
(Jason Fofana MD)
 
Departure
 
Departure
Time of Disposition: 1814
Disposition: STILL A PATIENT
Condition: Stable
Clinical Impression
Primary Impression: Alcohol intoxication
Secondary Impressions: Suicidal ideation
Referrals:
Art Barnes (PCP/Family)
 
Departure Forms:
Customer Survey
General Discharge Information
(Jason Fofana MD)
 
Departure
Comments
pt signed out to dr. guajardo, 9/5/18, 7am.
(Rosa CHAPMAN,Arsenio SALAS)
 
Critical Care Note
 
Critical Care Note
Critical Care Time: non-applicable
(Jason Fofana MD)

## 2018-09-05 VITALS — DIASTOLIC BLOOD PRESSURE: 74 MMHG | SYSTOLIC BLOOD PRESSURE: 135 MMHG

## 2018-09-05 VITALS — SYSTOLIC BLOOD PRESSURE: 136 MMHG | DIASTOLIC BLOOD PRESSURE: 82 MMHG

## 2018-09-05 VITALS — DIASTOLIC BLOOD PRESSURE: 87 MMHG | SYSTOLIC BLOOD PRESSURE: 146 MMHG

## 2018-09-05 VITALS — DIASTOLIC BLOOD PRESSURE: 63 MMHG | SYSTOLIC BLOOD PRESSURE: 109 MMHG

## 2018-09-05 VITALS — SYSTOLIC BLOOD PRESSURE: 163 MMHG | DIASTOLIC BLOOD PRESSURE: 100 MMHG

## 2018-09-05 VITALS — SYSTOLIC BLOOD PRESSURE: 132 MMHG | DIASTOLIC BLOOD PRESSURE: 76 MMHG

## 2018-09-05 VITALS — DIASTOLIC BLOOD PRESSURE: 71 MMHG | SYSTOLIC BLOOD PRESSURE: 111 MMHG

## 2018-09-05 VITALS — DIASTOLIC BLOOD PRESSURE: 78 MMHG | SYSTOLIC BLOOD PRESSURE: 132 MMHG

## 2018-09-05 VITALS — SYSTOLIC BLOOD PRESSURE: 117 MMHG | DIASTOLIC BLOOD PRESSURE: 82 MMHG

## 2018-09-05 VITALS — DIASTOLIC BLOOD PRESSURE: 85 MMHG | SYSTOLIC BLOOD PRESSURE: 130 MMHG
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
## 2018-09-06 VITALS — DIASTOLIC BLOOD PRESSURE: 82 MMHG | SYSTOLIC BLOOD PRESSURE: 117 MMHG

## 2018-09-06 VITALS — SYSTOLIC BLOOD PRESSURE: 132 MMHG | DIASTOLIC BLOOD PRESSURE: 87 MMHG

## 2018-09-06 VITALS — DIASTOLIC BLOOD PRESSURE: 86 MMHG | SYSTOLIC BLOOD PRESSURE: 135 MMHG

## 2018-09-06 VITALS — DIASTOLIC BLOOD PRESSURE: 85 MMHG | SYSTOLIC BLOOD PRESSURE: 122 MMHG

## 2018-09-06 VITALS — DIASTOLIC BLOOD PRESSURE: 67 MMHG | SYSTOLIC BLOOD PRESSURE: 111 MMHG

## 2018-09-06 VITALS — SYSTOLIC BLOOD PRESSURE: 124 MMHG | DIASTOLIC BLOOD PRESSURE: 78 MMHG

## 2018-09-06 VITALS — SYSTOLIC BLOOD PRESSURE: 116 MMHG | DIASTOLIC BLOOD PRESSURE: 85 MMHG

## 2018-09-06 VITALS — DIASTOLIC BLOOD PRESSURE: 93 MMHG | SYSTOLIC BLOOD PRESSURE: 127 MMHG

## 2018-09-06 VITALS — SYSTOLIC BLOOD PRESSURE: 126 MMHG | DIASTOLIC BLOOD PRESSURE: 71 MMHG

## 2018-09-06 VITALS — DIASTOLIC BLOOD PRESSURE: 80 MMHG | SYSTOLIC BLOOD PRESSURE: 126 MMHG

## 2018-09-06 VITALS — SYSTOLIC BLOOD PRESSURE: 127 MMHG | DIASTOLIC BLOOD PRESSURE: 93 MMHG

## 2018-09-06 VITALS — SYSTOLIC BLOOD PRESSURE: 118 MMHG | DIASTOLIC BLOOD PRESSURE: 67 MMHG

## 2018-09-06 NOTE — PN- ATT ADDEND
Attending Addendum
Attending Brief Note
Patient seen and examined, saying that he is feeling tired today.  He was 
sleeping but did wake up.
57-year-old male with past medical history significant for hepatitis C, 
hypothyroidism, pancreatitis, anxiety, depression, alcohol use who was admitted 
to [facility] with increasing depression and alcohol use suicidal ideation.
 
Vital Signs
 
 
Date Time Temp Pulse Resp B/P B/P Pulse O2 O2 Flow FiO2
 
     Mean Ox Delivery Rate 
 
09/06 1217  80  126/80     
 
09/06 0959  97  135/86     
 
09/06 0823 96.3 90  127/93     
 
09/06 0814 96.3 90 18 127/93     
 
09/06 0807 96.3 90  127/93     
 
09/06 0551  57  132/87     
 
09/06 0314  52  111/67     
 
09/06 0056  84       
 
09/06 0047  52  124/78     
 
09/05 2202 95.2 95  109/63     
 
09/05 1959 97.0 80  117/82     
 
09/05 1953 97.0 80  117/82     
 
09/05 1658 98.6 74  130/85     
 
09/05 1651 98.6 74  130/85     
 
09/05 1615       Room Air  
 
09/05 1614 98.3 57 18 136/82  94 Room Air  
 
09/05 1613 98.3 57 18 136/82     
 
09/05 1400 98.2 62 16 132/76     
 
09/05 1400 98.2 62 16 132/76  95 Room Air  
 
 
on exam; 
aox3, nad. 
cv; s1, s2, rrr
resp; clear
abd; soft, nt, bs+
ext; no edema
 
 Laboratory Tests
 
 
 09/04 09/04
 
 1611 1609
 
Chemistry  
 
  Sodium (137 - 145 mmol/L)  148  H
 
  Potassium (3.5 - 5.1 mmol/L)  3.8
 
  Chloride (98 - 107 mmol/L)  113  H
 
  Carbon Dioxide (22 - 30 mmol/L)  26
 
  Anion Gap (5 - 16)  9
 
  BUN (9 - 20 mg/dL)  5  L
 
  Creatinine (0.7 - 1.2 mg/dL)  0.7
 
  Estimated GFR (>60 ml/min)  > 60
 
  BUN/Creatinine Ratio (7 - 25 %)  7.1
 
  Glucose (65 - 99 mg/dL)  103  H
 
  Calcium (8.4 - 10.2 mg/dL)  9.2
 
  Total Bilirubin (0.2 - 1.3 mg/dL)  0.5
 
  AST (17 - 59 U/L)  96  H
 
  ALT (21 - 72 U/L)  76  H
 
  Alkaline Phosphatase (< 127 U/L)  59
 
  Total Protein (6.3 - 8.2 g/dL)  7.1
 
  Albumin (3.5 - 5.0 g/dL)  4.1
 
  Globulin (1.9 - 4.2 gm/dL)  3.0
 
  Albumin/Globulin Ratio (1.1 - 2.2 %)  1.4
 
  Lipase (23 - 300 U/L)  193
 
  TSH &T3 &Free T4 Intrp (0.27 - 4.20 uIU/mL)  0.619
 
Hematology  
 
  CBC w Diff  NO MAN DIFF REQ
 
  WBC (4.8 - 10.8 /CUMM)  5.1
 
  RBC (4.70 - 6.10 /CUMM)  4.61  L
 
  Hgb (14.0 - 18.0 G/DL)  15.8
 
  Hct (42 - 52 %)  46.6
 
  MCV (80.0 - 94.0 FL)  101.0  H
 
  MCH (27.0 - 31.0 PG)  34.3  H
 
  MCHC (33.0 - 37.0 G/DL)  34.0
 
  RDW (11.5 - 14.5 %)  15.7  H
 
  Plt Count (130 - 400 /CUMM)  156
 
  MPV (7.4 - 10.4 FL)  7.9
 
  Gran % (42.2 - 75.2 %)  37.8  L
 
  Lymphocytes % (20.5 - 51.1 %)  52.5  H
 
  Monocytes % (1.7 - 9.3 %)  8.5
 
  Eosinophils % (0 - 5 %)  0.5
 
  Basophils % (0.0 - 2.0 %)  0.7
 
  Absolute Granulocytes (1.4 - 6.5 /CUMM)  1.9
 
  Absolute Lymphocytes (1.2 - 3.4 /CUMM)  2.7
 
  Absolute Monocytes (0.10 - 0.60 /CUMM)  0.4
 
  Absolute Eosinophils (0.0 - 0.7 /CUMM)  0
 
  Absolute Basophils (0.0 - 0.2 /CUMM)  0
 
Toxicology  
 
  Urine Opiates Screen (>2000 NG/ML) < 100 
 
  Methadone Screen (>300 NG/ML) < 40 
 
  Barbiturate Screen (>200 NG/ML) < 60 
 
  Ur Phencyclidine Scrn (>25 NG/ML) < 6.00 
 
  Amphetamines Screen (>1000 NG/ML) 105 
 
  U Benzodiazepines Scrn (>200 NG/ML) < 85 
 
  Urine Cocaine Screen (>300 NG/ML) < 50 
 
  Urine Cannabis Screen (>50 NG/ML) < 5.00 
 
  Serum Alcohol (<10 MG/DL)  373.0
 
 
A/P: 57-year-old male with past medical history significant for hepatitis C, 
hypothyroidism, pancreatitis, anxiety, depression, alcohol use who was admitted 
to [facility] with increasing depression and alcohol use suicidal ideation. 
 
Patient has been started on Ativan.  He also has a history of hypothyroidism and
his levothyroxine has been continued.
 
He recently had thyroid function testing done and it was normal.
The rest of the labs look okay.  His vital signs are stable.
I will defer the further psychiatric management to the psychiatrist.
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
## 2018-09-07 VITALS — SYSTOLIC BLOOD PRESSURE: 120 MMHG | DIASTOLIC BLOOD PRESSURE: 66 MMHG

## 2018-09-07 VITALS — DIASTOLIC BLOOD PRESSURE: 84 MMHG | SYSTOLIC BLOOD PRESSURE: 125 MMHG

## 2018-09-07 VITALS — DIASTOLIC BLOOD PRESSURE: 71 MMHG | SYSTOLIC BLOOD PRESSURE: 107 MMHG

## 2018-09-07 VITALS — SYSTOLIC BLOOD PRESSURE: 146 MMHG | DIASTOLIC BLOOD PRESSURE: 92 MMHG

## 2018-09-07 VITALS — DIASTOLIC BLOOD PRESSURE: 79 MMHG | SYSTOLIC BLOOD PRESSURE: 117 MMHG

## 2018-09-07 VITALS — SYSTOLIC BLOOD PRESSURE: 107 MMHG | DIASTOLIC BLOOD PRESSURE: 91 MMHG

## 2018-09-07 VITALS — SYSTOLIC BLOOD PRESSURE: 117 MMHG | DIASTOLIC BLOOD PRESSURE: 68 MMHG

## 2018-09-07 NOTE — SOCIAL WORKER PROG NOTE PSYCH
Social Work Progress Note
Progress Note
Met with Sawyer this morning.  Reports some withdrawal symptoms with still feeling 
somewhat tremulous.  He feels it is taking him awhile to feel better.  Mood is 
calm and hopeful.  Motivated to go into treatment.  He signed a release for his 
 Leilani Carey at [facility].  I told him that 
I had heard that she is also looking into programs for him.  He said he tried 
calling her several times yesterday, but didn't get her.  He signed the 
application for [facility] this morning.  Hilary Rollins (community 
health worker) is faxing the application to [facility].  We talked
about what he may do after rehab and if he was looking to go to a long term 
program.  He said possibly, but definetely didn't want to go back to [facility].  Reported that there was alot of drug use and chaos in the program.  
Called ANNALISA Carey to discuss a plan [phone].  She said she put in 
for a rehab referral, but she has to wait to see what contracted program has a 
bed open.  Encouraged her to call me back to update us on if something becomes 
available.  Let her know what we were working on.
[facility] scheduled a phone screening with Sawyer for Monday 2pm.

## 2018-09-07 NOTE — CP SOUTH PROGRESS NOTE PSYCH
Psych (Inpt) Progress Note
Progress Note
Sawyer is a 57-year-old white male who suffers from chronic severe alcoholism and 
frequent relapses.  He called [place] [employer]--while intoxicated--to 
report and that he was having thoughts of stabbing self. He is well known to the
staff on CPS (this being his 4th admission in exactly 12 months). 
 
Diagnostic Impression:
Unspecified Depressive disorder (most likely substance-induced depressive 
disorder)
Alcohol use disorder
Cannabis use disorder
Opioid use disorder
Hepatitis C
Hypothyroidism
 
Vital Signs
 
 
Date Time Temp Pulse Resp B/P B/P Pulse O2 FiO2
 
09/07 0752 96.9 91  107/91    
 
09/07 0733 96.9 97 18 107/71    
 
09/07 0725 96.9 97  107/71    
 
09/07 0508  56  120/66    
 
09/07 0133  58  117/68    
 
09/06 2143 98.6 69  118/67    
 
09/06 1951 97.5 96  122/85    
 
09/06 1950 97.5 96  122/85    
 
09/06 1830 97.5 96  126/71    
 
09/06 1604 97.4 112  117/82    
 
09/06 1425  92  116/85    
 
 
Mental State:
Steady gait
Very tan from sun exposure/homelessness
Attitude towards examiner:
Cooperative
Normal psychomotor activity, No bizarre behaviors, Normal speech, not pressured,
not slurred, Constricted affect, Depressed mood, Denied thoughts of suicide 
today
Denied violent thoughts or thoughts of homicide
Denied hallucinations
Denied feeling paranoid, there were no delusions during the interview.
Coherent, no thought disorder
Poor insight
Poor judgment, especially when intoxicated
Alert and oriented to place and person, No difficulties with information 
processing. No evidence of short-term memory impairment.
 
Treatment Plan Update:
Reduce CIWA to Y2mnioo while awake with lorazepam coverage as per protocol
Change regular schedule benzodiazepine to Librium 100 mg in divided doses today;
75 mg in divided doses tomorrow; 50 mg in divided doses Sunday, and to be 
reevaluated Monday morning
Continue Abilify 10 mg daily
Continue gabapentin 
Continue all other medications unchanged

## 2018-09-08 VITALS — DIASTOLIC BLOOD PRESSURE: 88 MMHG | SYSTOLIC BLOOD PRESSURE: 137 MMHG

## 2018-09-08 VITALS — SYSTOLIC BLOOD PRESSURE: 137 MMHG | DIASTOLIC BLOOD PRESSURE: 88 MMHG

## 2018-09-08 VITALS — DIASTOLIC BLOOD PRESSURE: 76 MMHG | SYSTOLIC BLOOD PRESSURE: 138 MMHG

## 2018-09-08 VITALS — SYSTOLIC BLOOD PRESSURE: 138 MMHG | DIASTOLIC BLOOD PRESSURE: 76 MMHG

## 2018-09-08 NOTE — CP SOUTH PROGRESS NOTE PSYCH
Psych (Inpt) Progress Note
Progress Note
Include the following elements, when applicable:
Involvement in the active treatment of the patient with behavioral observations 
of the patient and the patient's response to the treatment.
Review of the ongoing treatment process in the context of the treatment plan.
Indication of how multi-disciplinary staff members are carrying out the 
treatment plan.
Plans for future interventions and recommendations for revision of the treatment
plan.
Liaison with other physicians/providers.
Progress Note:
Dr. GAMEZ's note reviewed.  Medication list reviewed.  Case discussed with nurse, 
who reports that the patient is denying suicidal ideation.  CIWA is 
unremarkable.  We are looking into possible rehab placement.
 
Patient seen at 10:37 AM.  Reports doing all right.  Feels a little hyper today 
and he does not know why.  Affect is calm and blunted.  Mood is good.  Rates sad
mood about 3/10.  Reports having a lot of anxiety at 8/10 and feels hyper.  
Denies feeling hopeless, helpless or worthless.  Does feel guilty.  Denies 
having suicidal thoughts today.  Denies homicidal ideation.  Denies auditory and
visual hallucinations and paranoid ideation.  Sleep is so-so.  Reports he is 
eating well.  Energy: "today, I'm full of it."  Tolerating medications well.  He
has minor hand tremor.  Patient requests Abilify dose be increased to 10 mg 
twice daily because he states it helps with sleep and helps him to be calm.  
Reports he has been on 20 mg a day in the past.  I adjusted dose to 10 mg twice 
daily but I informed him that his insurance plan may not cover 2 pills a day on 
an outpatient basis.  Requested I change gabapentin 600 mg dosing time from 1 PM
and 5 PM to 9 AM and 1 PM.  I made that change.  Bedtime dose will remain the 
same.
 
IMPRESSION:
Slow progress.  Continue present treatment plan.

## 2018-09-09 VITALS — DIASTOLIC BLOOD PRESSURE: 88 MMHG | SYSTOLIC BLOOD PRESSURE: 113 MMHG

## 2018-09-09 VITALS — SYSTOLIC BLOOD PRESSURE: 130 MMHG | DIASTOLIC BLOOD PRESSURE: 75 MMHG

## 2018-09-09 VITALS — SYSTOLIC BLOOD PRESSURE: 117 MMHG | DIASTOLIC BLOOD PRESSURE: 79 MMHG

## 2018-09-09 VITALS — SYSTOLIC BLOOD PRESSURE: 113 MMHG | DIASTOLIC BLOOD PRESSURE: 88 MMHG

## 2018-09-09 VITALS — DIASTOLIC BLOOD PRESSURE: 77 MMHG | SYSTOLIC BLOOD PRESSURE: 114 MMHG

## 2018-09-09 NOTE — CP SOUTH PROGRESS NOTE PSYCH
Psych (Inpt) Progress Note
Progress Note
Include the following elements, when applicable:
Involvement in the active treatment of the patient with behavioral observations 
of the patient and the patient's response to the treatment.
Review of the ongoing treatment process in the context of the treatment plan.
Indication of how multi-disciplinary staff members are carrying out the 
treatment plan.
Plans for future interventions and recommendations for revision of the treatment
plan.
Liaison with other physicians/providers.
Progress Note:
Case and treatment plan discussed with nurse, who reports that the patient is 
denying suicidal ideation.  CIWA scores are unremarkable.
 
Patient seen 11:30 AM.  Reports doing all right.  Has no complaints.  Reports he
is starting to feel "real good."  Reports he has a phone screen with [employer] 
tomorrow.  Affect is calm and blunted to euthymic.  Mood is good.  Rates sad 
mood 0/10.  Rates anxiety about 4-5/10 and he feels it could be related to 
having too much coffee, 4 cups today.  Denies feeling hopeless, helpless or 
worthless.  Feels guilty "always."  Denies active and passive suicidal ideation.
 Denies homicidal ideation.  Denies auditory and visual hallucinations and 
paranoid ideation.  Reports he finally slept very well last night, 8 straight 
hours.  Appetite is good.  Energy: States "I'm full of it."  Tolerating 
medications well, without complaint.
 
IMPRESSION:
Slow progress.  Continue present treatment plan.

## 2018-09-10 VITALS — SYSTOLIC BLOOD PRESSURE: 129 MMHG | DIASTOLIC BLOOD PRESSURE: 84 MMHG

## 2018-09-10 VITALS — DIASTOLIC BLOOD PRESSURE: 73 MMHG | SYSTOLIC BLOOD PRESSURE: 113 MMHG

## 2018-09-10 VITALS — DIASTOLIC BLOOD PRESSURE: 83 MMHG | SYSTOLIC BLOOD PRESSURE: 132 MMHG

## 2018-09-10 VITALS — SYSTOLIC BLOOD PRESSURE: 132 MMHG | DIASTOLIC BLOOD PRESSURE: 83 MMHG

## 2018-09-10 VITALS — SYSTOLIC BLOOD PRESSURE: 124 MMHG | DIASTOLIC BLOOD PRESSURE: 92 MMHG

## 2018-09-10 VITALS — DIASTOLIC BLOOD PRESSURE: 92 MMHG | SYSTOLIC BLOOD PRESSURE: 124 MMHG

## 2018-09-10 NOTE — SOCIAL WORKER PROG NOTE PSYCH
Social Work Progress Note
Progress Note
Determination Status:
****************************     PENDED     ****************************
The services requested require additional review. You will be contacted 
regarding the status of this request if further information is needed. An 
authorization decision will be made within the required timeframes and details 
of that decision may be found under the member's authorization history.
Member Name
Member ID
Member 
Subscriber Name
Subscriber ID
FE ALEXISYASOVSZKY
[id number]
1960
FE ALEXISYASOVSZKY 
[id number]
 
Pended Authorization #
Client Authorization #
Type of Request
 
[id number]
[id number]
CONCURRENT
 
 
Date of Admission/ Start of Services
Requested From
Submission Date
   
2018
2018
09/10/2018
   
 
Level of Service
Type of Service
Level of Care
Type of Care
 
INPATIENT/HLOC
MENTAL HEALTH
INPATIENT
INPATIENT HOSPITAL - INPATIENT HOSPITAL
 
 
Reason Code
 
P76
 
 
Provider Name & Address
Provider ID
Provider Alternate ID
NPI # for Authorization
JAMES LEAL
[address] [facility] [id number]
[id number]
[id number]
N/A

## 2018-09-10 NOTE — SOCIAL WORKER PROG NOTE PSYCH
Social Work Progress Note
Progress Note
Met with Sawyer late this morning.  He handed me an application for [facility]. 
He has a phone screening with [facility] today at 2pm.  Reports his mood was 
improved over the weekend, but  still depressed at times.  He talked about the 
importance of getting into a program and working on obtaining housing after 
that.  He is hoping that he will become eligible for Social Security benefits.  
Talked about how he has a  that he is working with.  Reminded him that 
people often get denied.  Encouraged him to consider [facility] or [facility] as possible options for longer term treatment when he is done with a 30 
day program.  He didn't seem too open to that idea.  Reports he is sleeping and 
eating well.  Occasional thoughts about drinking, that quickly pass.  He is 
struggling more with the idea of wanting a cigarette.  He signed a release for 
[facility], so that I can let them know what his d/c plan entails.

## 2018-09-10 NOTE — CP SOUTH PROGRESS NOTE PSYCH
Psych (Inpt) Progress Note
Progress Note
I reviewed the notes of Dr. Cisse over the weekend (9/8 and 9/9/2018).
The patient's progress, treatment plan, and aftercare plans were discussed in 
the treatment team meeting this morning. Team members included: LCSWs, RNs, 
Activities Therapist, and Psychiatrist.
Vital Signs
 
 
Date Time Temp Pulse Resp B/P B/P Pulse O2 FiO2
 
     Mean Ox Delivery 
 
09/10 1250  79  113/73    
 
09/10 0903 96.4 75 18 124/92    
 
09/10 0805  75  124/92    
 
09/10 0728 96.4 75  124/92    
 
09/09 2004 97.7 78  114/77    
 
09/09 2000 97.7 78  114/77    
 
09/09 1604 97.3 83  130/75    
 
 
Mental State:
Steady gait, Cooperative, calm, Normal psychomotor activity, No bizarre 
behaviors, Normal speech, not pressured, not slurred, Constricted affect, 
Depressed mood, Denied thoughts of suicide today
Denied violent thoughts or thoughts of homicide
Denied hallucinations, Denied feeling paranoid, there were no delusions during 
the interview.
Coherent, no thought disorder
Alert and oriented to place and person, No difficulties with information 
processing. No evidence of short-term memory impairment.
 
Treatment Plan Update:
Continue all medications unchanged
 Current Medications
 
 
  Sig/Leyda Start time  Last
 
Medication Dose Route Stop Time Status Admin
 
Al Hydroxide/Mg  30 ML Q4-6 PRN PRN 09/05 1100 AC 
 
Hydroxide  PO   
 
Amitriptyline HCl 50 MG AT BEDTIME 09/05 2100 AC 09/09
 
  PO   2136
 
Aripiprazole 10 MG BID 09/08 2100 AC 09/10
 
  PO   0903
 
Benztropine Mesylate 1 MG Q6P PRN 09/05 1100 AC 
 
  PO   
 
Benztropine Mesylate 1 MG Q6P PRN 09/05 1100 AC 
 
  IM   
 
Chlordiazepoxide HCl 25 MG BID 09/09 0900 DC 09/10
 
  PO 09/10 1200  0904
 
Gabapentin 600 MG 0900,1300 09/08 1300 AC 09/10
 
  PO   1250
 
Gabapentin 900 MG AT BEDTIME 09/06 2100 AC 09/09
 
  PO   2137
 
Haloperidol 5 MG Q6P PRN 09/05 1100 AC 
 
  PO   
 
Haloperidol 5 MG Q6P PRN 09/05 1100 AC 
 
  IM   
 
Ibuprofen 600 MG Q6P PRN 09/05 1100 AC 09/07
 
  PO   1224
 
Levothyroxine Sodium 0.05 MG DAILY AC 09/05 1051 AC 09/10
 
  PO   0630
 
Lorazepam 0.5 MG ONCE 09/10 0000 DC 
 
  PO 09/10 0001  
 
Lorazepam 2 MG Q2P PRN 09/05 1100 AC 
 
  PO   
 
Lorazepam 1 MG Q2P PRN 09/05 1100 AC 09/06
 
  PO   1613
 
Lorazepam 2 MG Q6P PRN 09/05 1100 AC 
 
  IM   
 
Magnesium Hydroxide 30 ML AT BEDTIME AS NEED.. 09/05 1100 AC 
 
  PO   
 
Multivitamins 1 TAB DAILY 09/05 1048 AC 09/10
 
  PO   0903
 
Nicotine 2 MG Q2P PRN 09/05 1100 AC 09/10
 
  PO   1557
 
Tamsulosin HCl 0.4 MG DAILY 09/05 1050 AC 09/10
 
  PO   0903

## 2018-09-11 VITALS — SYSTOLIC BLOOD PRESSURE: 109 MMHG | DIASTOLIC BLOOD PRESSURE: 82 MMHG

## 2018-09-11 VITALS — SYSTOLIC BLOOD PRESSURE: 127 MMHG | DIASTOLIC BLOOD PRESSURE: 79 MMHG

## 2018-09-11 NOTE — CP SOUTH PROGRESS NOTE PSYCH
Psych (Inpt) Progress Note
Progress Note
The patient's progress, treatment plan, and aftercare plans were discussed in 
the treatment team meeting this morning. Team members included: LCSWs, RNs, OTR/
L, Activities Therapist, and Psychiatrist.
 
Vital Signs
 
 
Date Time Temp Pulse B/P B/P O2
 
09/11 0811  95 109/82  
 
09/11 0752 98.9 95 109/82  
 
09/11 0750 98.9 95 109/82  
 
09/10 1958 97.8 92 132/83  
 
09/10 1952 97.8 92 132/83  
 
 
EKG showed pre-mature atrial contractions.
 
Mental State:
Sawyer was steady on his feet. He denied chest pain or shortness of breath but 
reported that sometimes he felt a skipped beat 9in the past not today).
He was cooperative, calm, and showed normal psychomotor activity, nNo bizarre 
behaviors, normal speech, not pressured, not slurred, constricted affect, he 
reported that he has been in a better mood (yesterday afternoon and today)
Sawyer denied thoughts of suicide today, denied violent thoughts or thoughts of 
homicide. Sawyer denied hallucinations, denied feeling paranoid, there were no 
delusions during the interview. He was coherent, no thought disorder. He was 
alert and oriented to place and person, No difficulties with information 
processing. No evidence of short-term memory impairment.
 
Treatment Plan Update:
Discussed abnormalities in the EKG with Sawyer and advised him of a reduction in 
the amitriptyline and to repeat the EKG tomorrow on the lower dose of 
amitriptyline which he agreed to:
Reduce amitriptyline to 25 MG AT BEDTIME
Change Gabapentin to 600 MG @ 0900 & 1200 mg QHS
Repeat EKG tomorrow
Ibuprofen 600 MG Q6P PRN 
Levothyroxine 50mcg DAILY AC
Aripiprazole 10 MG BID
Tamsulosin HCl 0.4 MG DAILY
 
 
 
 
 
 
 
 
 
Lorazepam 0.5 MG ONCE 09/10 0000 DC 
  PO 09/10 0001  
Lorazepam 2 MG Q2P PRN 09/05 1100 AC 
  PO   
Lorazepam 1 MG Q2P PRN 09/05 1100 AC 09/06
  PO   1613
Lorazepam 2 MG Q6P PRN 09/05 1100 AC 
  IM   
Magnesium Hydroxide 30 ML AT BEDTIME AS NEED.. 09/05 1100 AC 
  PO   
Multivitamins 1 TAB DAILY 09/05 1048 AC 09/10
  PO   0903
Nicotine 2 MG Q2P PRN 09/05 1100 AC 09/10
  PO   1557
Tamsulosin HCl 0.4 MG DAILY 09/05 1050 AC 09/10
  PO   0903
 
 
 
DICTATED BY: Gharaibeh MD,Numan 
DATE/TIME DICTATED:09/10/18 / 0814
:LATISHA

## 2018-09-11 NOTE — SOCIAL WORKER PROG NOTE PSYCH
Social Work Progress Note
Progress Note
Sawyer reported that he woke up in a down mood today.  Couldn't identify why, but 
stated he was trying to just work through it and felt better.  Shared that 
[facility] will most likely have a bed for him tomorrow.  They were sent PPD 
results, hx and physical, and recent EKG.  Sawyer stated he has to do another EKG 
tomorrow, so don't know if that peice will hold up the d/c for tomorrow.  We 
talked about his plan after [facility], which continues to be him trying to get 
his own place.  Talked about the benefits of him going to a sober house or 
longer tx program.  He is not thinking about that at this point.  He is 
interested in getting on Antabuse at [facility].

## 2018-09-11 NOTE — SOCIAL WORKER PROG NOTE PSYCH
Social Work Progress Note
Progress Note
FE ALEXISREMY
[id number]
1960
FE KAYLEE 
[id number]
 
Pended Authorization #
Client Authorization #
Type of Request
 
[id number]
[id number]
CONCURRENT
 
 
Date of Admission/ Start of Services
Requested From
Submission Date
   
09/05/2018
09/11/2018
09/11/2018

## 2018-09-12 VITALS — SYSTOLIC BLOOD PRESSURE: 113 MMHG | DIASTOLIC BLOOD PRESSURE: 74 MMHG

## 2018-09-12 VITALS — DIASTOLIC BLOOD PRESSURE: 87 MMHG | SYSTOLIC BLOOD PRESSURE: 123 MMHG

## 2018-09-12 NOTE — SOCIAL WORKER PROG NOTE PSYCH
Social Work Progress Note
Progress Note
Met with Sawyer this afternoon.  He reported he was not having a great day due to 
his mood being a little on edge/ irritable.  He said he got into a verbal 
conflict with nursing last night about going to AA and he got into an argument 
with another peer today, because he felt he was picking on other peers.  He 
reports this is not like him. He can't identify what is going on.  I told him he
is adjusting to life without alcohol and that may have something to do with it. 
He also said being around others with issues here is contributing to his mood.  
He said he is happy to hear he is going to [facility] tomorrow.  Let him know we 
will be scheduling his ride with [employer] after team meeting tomorrow.  He 
attempted to call his PO to let her know where he was going.  He got her 
voicemail and left a message.

## 2018-09-12 NOTE — CP SOUTH PROGRESS NOTE PSYCH
Psych (Inpt) Progress Note
Progress Note
The patient's progress, treatment plan, and aftercare plans were discussed in 
the treatment team meeting this morning. Team members included: LCSWs, RNs, OTR/
L, Activities Therapist, and Psychiatrist.
 
Vital Signs:
 
 
Date Time Temp Pulse B/P B/P Pulse O2
 
09/12 0802 97.1 92 123/87   
 
09/12 0752 97.1 92 123/87   
 
09/11 1932 97.6 97 127/79   
 
 
Mental State:
Sawyer was alert and oriented to place and person. He was cooperative, calm, and 
showed normal psychomotor activity. There were no bizarre behaviors, normal 
speech (not pressured, not slurred)
He's showing brighter affect & reported that he's been in a "good" mood.
Sawyer denied thoughts of suicide today, and denied violent thoughts or thoughts of
homicide. Sawyer denied hallucinations, denied feeling paranoid, there were no 
delusions during the interview. He was coherent, no thought disorder. He has no 
difficulties with information processing and no evidence of short-term memory 
impairment.
 
Treatment Plan Update:
Tolerated yesterday's changes very well, denied side effects today. We agreed on
the following plan:
Continue gabapentin 600 mg in a.m. & 1200 mg QHS
Continue Ibuprofen 600 MG Q6P PRN 
Levothyroxine 50mcg DAILY AC
Aripiprazole 10 MG BID
Tamsulosin HCl 0.4 MG DAILY

## 2018-09-12 NOTE — PATIENT DISCHARGE INSTRUCTIONS
Psych Discharge Inst
 
General Discharge Information
Reason for Admission:
wishing death
Psy Discharge Primary Diag+ MDD
Psy Discharge Secondary Diag+ Alcohol Dependence
Summary Tests/Major Procedures
 Lab
 
 
Cholesterol 220 MG/DL H 08/09/18 0949
 
Cholesterol/HDL Ratio 3 % 08/09/18 0949
 
HDL Cholesterol 87 mg/dL H 08/09/18 0949
 
Hemoglobin A1c 5.0 % 08/09/18 0949
 
LDL Cholesterol, Calc 120 mg/dL 08/09/18 0949
 
Triglycerides 69 mg/dL 08/09/18 0949
 
 
Studies Pending at DC:
None
 
Patient Instructions
Contact Information
Your Psychiatrist on [facility] was Gharaibeh MD,Aron
 
* If you are experiencing an emergency related to this hospitalization, please 
call [phone] to contact the treating psychiatrist or the psychiatrist-on-
call.
 
* To Request a copy of your medical records, please contact the Medical Records 
Department at [phone].
 
* To request results of studies pending at the time of discharge, please call 
[phone].
 
* Continue your Medications until directed to stop by your Healthcare provider.
 
General Medication Information
Please continue to take your new medications and your continued home medications
, unless otherwise indicated on your discharge medication list, or unless 
directed by your MD or APRN to stop them.
 
 
Special Instructions
Diet Regular
Activity Normal
- Tobacco Use Treatment Offered
Post DC Medications Offered: Refused Tob Medication Tx
Post DC Tobacco Treatment Plan: Refused Tobacco Tx Pgm
- EtOH/Drug Use D/O Treatment Offered
Post DC Medications Offered: Script Given-See Med List
Post DC EtOH/SubAbuse TX Plan: Other SubAbuse/Dual Pgm
Metabolic Screening
Patient on a neuroleptic(s) .
     Enter below results for Hemoglobin A1C, 
     and lipid panel if obtained during the last 365 days.
 
BMI: 23.600     
 
Blood Pressure: 105/73
 
Laboratory Results From [facility] (If applicable):
 Lab
 
 
Cholesterol 220 MG/DL H 08/09/18 0949
 
Cholesterol/HDL Ratio 3 % 08/09/18 0949
 
HDL Cholesterol 87 mg/dL H 08/09/18 0949
 
Hemoglobin A1c 5.0 % 08/09/18 0949
 
LDL Cholesterol, Calc 120 mg/dL 08/09/18 0949
 
Triglycerides 69 mg/dL 08/09/18 0949
 
 
 
 
Advance Directives
Does the Patient have Medical Advance Directives No/Refused further info
Does Pt have Psychiatric Advance Directives? No/Refused further info
Information About Psychiatric Advance Directives Provided? Refused
 
Discharge Plan
Post Hospital Treatment Plan:
Residential Rehab

## 2018-09-12 NOTE — SOCIAL WORKER PROG NOTE PSYCH
Social Work Progress Note
Progress Note
Psychiatric Community Health Worker Note on Behalf of Kayla Lam LCSW
 
On 9.12.18, this writer spoke with Zulema Dunbar from [facility] ([phone]) 
and was notified that the pt has been accepted into the program based on the 
referral inforamtion faxed and reviewed.  Arrangements for the pt to be picked 
up from  on 9.13.18 have been arranged between the hours of 12:00pm-3:00pm per
Zulema; these arragements will be confirmed tomorrow morning.  Dr. GAMEZ was 
notified of the discharge plan and the request for the pts meds to be sent to 
[facility]([phone]).
 
Hilary Rollins (Tish)
Psychiatric Community Health Worker

## 2018-09-13 VITALS — DIASTOLIC BLOOD PRESSURE: 73 MMHG | SYSTOLIC BLOOD PRESSURE: 105 MMHG

## 2018-09-13 NOTE — SOCIAL WORKER PROG NOTE PSYCH
Social Work Progress Note
Faxed Referral(s)
   Referred To: [facility]
   Transition of Care Documents sent: Health Summary, W10
   Faxed to: [facility]
   Fax #: [phone]
   Faxed by: Kayla Lam
   Date faxed: 09/13/18
   Time Faxed: [phone]

## 2018-09-13 NOTE — SOCIAL WORKER PROG NOTE PSYCH
Social Work Progress Note
Progress Note
Sawyer reports having a good day so far.  He is looking foward to going to 
[facility] this afternoon.  He continues to say that he will complete the 30 day 
rehab and then get into an apartment.  He mentioned that he met a female here on
the unit and they are talking about living together.  I talked with him 
yesterday and again today about the risk of developing relationships with people
here on the unit.  Encouraged him to think about that while he is at [facility] 
and not leave the program prematurely.  He stated he would not leave the program
and he is setting out to complete it.  He stated he will be going to [facility] after 
completion.  Told him that [facility]'s  will be coming to pick him up 
today between 12-3pm.  He seems ready to go.  
Transportation arrived about 11:40am.

## 2018-09-13 NOTE — CP SOUTH PROGRESS NOTE PSYCH
Psych (Inpt) Progress Note
Progress Note
Vital Signs:
 
 
Date Time Temp Pulse Resp B/P
 
09/13 0754 96.7 88 18 105/73
 
09/13 0750 96.7 88  105/73
 
Mental State:
Sawyer was in good humor and looking forward to going to residential rehab today (
[facility]). He was alert and oriented to place and person. He was
cooperative, calm, and showed normal psychomotor activity. There were no bizarre
behaviors, normal speech (not pressured, not slurred). He's showing brighter 
affect & reported that he's been in a "good" mood. Sawyer denied thoughts of 
suicide and denied violent thoughts or thoughts of homicide. Swayer denied 
hallucinations, denied feeling paranoid, and there were no observable delusions.
He was coherent (i.e. no thought disorder). He has no difficulties with 
information processing and no evidence of short-term memory impairment.
 
Treatment Plan Update:
Discharge to [facility]
 
 
 
 
 
Tolerated yesterday's changes very well, denied side effects today. We agreed on
the following plan:
Continue gabapentin 600 mg in a.m. & 1200 mg QHS
Continue Ibuprofen 600 mg Q6P PRN 
Levothyroxine 50mcg DAILY AC
Aripiprazole 10 MG BID
Tamsulosin 0.4 MG DAILY

## 2019-12-04 NOTE — SOCIAL WORKER PROG NOTE PSYCH
LM to schedule follow up appointment Social Work Progress Note
Progress Note
Sawyer was asleep in his room around 10am.  Woke up when prompted.  Met with Dr. Gharaibeh and I together.  Reviewed what has been going on since his discharge 
from here last.  He said he stayed with his Brother occasionally, but his 
Brother does not permit drinking and when he was too drunk to return there he 
didn't.  He also said as the drinking increased he stopped going there and to [facility].  He has been drinking 12-15 beers a day for the past couple of weeks. 
Denies other drug use.  Today he presents as fatigued/ worn.  Stated he is 
feeling a bit shaky from alcohol withdrawal.  He is oriented x3.  Denies any AH/
VH.  Denies current SI.  Stated he feels safe in the hospital and only has SI 
when he is out on the street and thinking about his situation.  He is committed 
to going to rehab this time.  Only wants to be referred to [facility] and states 
[facility] was starting conversation with them about a referral.  Not sure if one 
was made already.  He signed a release for [facility] and agreed to be referred 
to Crisis and Respite.  After our meeting he returned to bed due to not feeling 
well.  
Clinical packet was faxed with to [facility].